# Patient Record
Sex: FEMALE | Race: WHITE | NOT HISPANIC OR LATINO | Employment: FULL TIME | URBAN - METROPOLITAN AREA
[De-identification: names, ages, dates, MRNs, and addresses within clinical notes are randomized per-mention and may not be internally consistent; named-entity substitution may affect disease eponyms.]

---

## 2020-02-28 ENCOUNTER — HOSPITAL ENCOUNTER (OUTPATIENT)
Dept: GENERAL RADIOLOGY | Facility: HOSPITAL | Age: 45
Discharge: HOME | End: 2020-02-28
Attending: FAMILY MEDICINE

## 2021-07-21 ENCOUNTER — TRANSCRIBE ORDERS (OUTPATIENT)
Dept: ADMINISTRATIVE | Facility: HOSPITAL | Age: 46
End: 2021-07-21

## 2021-07-21 DIAGNOSIS — Z12.31 ENCOUNTER FOR SCREENING MAMMOGRAM FOR BREAST CANCER: Primary | ICD-10-CM

## 2021-10-12 ENCOUNTER — APPOINTMENT (OUTPATIENT)
Dept: MAMMOGRAPHY | Facility: HOSPITAL | Age: 46
End: 2021-10-12

## 2022-01-04 ENCOUNTER — APPOINTMENT (OUTPATIENT)
Dept: MAMMOGRAPHY | Facility: HOSPITAL | Age: 47
End: 2022-01-04

## 2022-03-09 ENCOUNTER — HOSPITAL ENCOUNTER (OUTPATIENT)
Dept: MAMMOGRAPHY | Facility: HOSPITAL | Age: 47
Discharge: HOME | End: 2022-03-09
Admitting: NURSE PRACTITIONER

## 2022-03-09 DIAGNOSIS — Z12.31 ENCOUNTER FOR SCREENING MAMMOGRAM FOR BREAST CANCER: ICD-10-CM

## 2022-03-09 PROCEDURE — 77067 SCR MAMMO BI INCL CAD: CPT

## 2022-03-09 PROCEDURE — 77063 BREAST TOMOSYNTHESIS BI: CPT

## 2022-06-21 ENCOUNTER — CLINICAL SUPPORT (OUTPATIENT)
Dept: GASTROENTEROLOGY | Facility: CLINIC | Age: 47
End: 2022-06-21

## 2022-06-21 ENCOUNTER — TELEPHONE (OUTPATIENT)
Dept: GASTROENTEROLOGY | Facility: CLINIC | Age: 47
End: 2022-06-21

## 2022-06-21 ENCOUNTER — PREP FOR SURGERY (OUTPATIENT)
Dept: OTHER | Facility: HOSPITAL | Age: 47
End: 2022-06-21

## 2022-06-21 DIAGNOSIS — Z12.11 COLON CANCER SCREENING: Primary | ICD-10-CM

## 2022-06-21 RX ORDER — SUMATRIPTAN 100 MG/1
TABLET, FILM COATED ORAL
COMMUNITY
Start: 2022-05-02

## 2022-06-21 RX ORDER — METHOCARBAMOL 750 MG/1
TABLET, FILM COATED ORAL
COMMUNITY
Start: 2022-05-02 | End: 2022-09-27

## 2022-06-21 RX ORDER — ARIPIPRAZOLE 5 MG/1
TABLET ORAL
COMMUNITY
Start: 2022-06-01 | End: 2022-09-27

## 2022-06-21 RX ORDER — FLUOXETINE HYDROCHLORIDE 40 MG/1
40 CAPSULE ORAL DAILY
COMMUNITY
Start: 2022-06-01

## 2022-06-21 RX ORDER — BUPROPION HYDROCHLORIDE 150 MG/1
300 TABLET ORAL EVERY MORNING
COMMUNITY
Start: 2022-06-01

## 2022-06-21 NOTE — TELEPHONE ENCOUNTER
Rama Mccoy  REASON FOR CALL Colon Screening  SENT IN [facility]  No past medical history on file.  Allergies   Allergen Reactions   • Aspirin Unknown - High Severity     No past surgical history on file.  Social History     Socioeconomic History   • Marital status:    Tobacco Use   • Smoking status: Never Smoker   • Smokeless tobacco: Never Used   Vaping Use   • Vaping Use: Never used   Substance and Sexual Activity   • Alcohol use: Not Currently     Family History   Problem Relation Age of Onset   • Colon cancer Maternal Grandmother          around 40       Current Outpatient Medications:   •  ARIPiprazole (ABILIFY) 5 MG tablet, , Disp: , Rfl:   •  buPROPion XL (WELLBUTRIN XL) 150 MG 24 hr tablet, , Disp: , Rfl:   •  FLUoxetine (PROzac) 40 MG capsule, , Disp: , Rfl:   •  methocarbamol (ROBAXIN) 750 MG tablet, , Disp: , Rfl:   •  SUMAtriptan (IMITREX) 100 MG tablet, , Disp: , Rfl:

## 2022-06-22 PROBLEM — Z12.11 COLON CANCER SCREENING: Status: ACTIVE | Noted: 2022-06-22

## 2022-09-28 ENCOUNTER — ANESTHESIA EVENT (OUTPATIENT)
Dept: GASTROENTEROLOGY | Facility: HOSPITAL | Age: 47
End: 2022-09-28

## 2022-09-28 ENCOUNTER — HOSPITAL ENCOUNTER (OUTPATIENT)
Facility: HOSPITAL | Age: 47
Setting detail: HOSPITAL OUTPATIENT SURGERY
Discharge: HOME | End: 2022-09-28
Attending: INTERNAL MEDICINE | Admitting: INTERNAL MEDICINE

## 2022-09-28 ENCOUNTER — ANESTHESIA (OUTPATIENT)
Dept: GASTROENTEROLOGY | Facility: HOSPITAL | Age: 47
End: 2022-09-28

## 2022-09-28 VITALS
SYSTOLIC BLOOD PRESSURE: 117 MMHG | HEART RATE: 72 BPM | TEMPERATURE: 97.6 F | BODY MASS INDEX: 36.8 KG/M2 | OXYGEN SATURATION: 100 % | WEIGHT: 220.9 LBS | DIASTOLIC BLOOD PRESSURE: 81 MMHG | HEIGHT: 65 IN | RESPIRATION RATE: 11 BRPM

## 2022-09-28 DIAGNOSIS — Z12.11 COLON CANCER SCREENING: ICD-10-CM

## 2022-09-28 LAB — B-HCG UR QL: NEGATIVE

## 2022-09-28 PROCEDURE — 88305 TISSUE EXAM BY PATHOLOGIST: CPT | Performed by: INTERNAL MEDICINE

## 2022-09-28 PROCEDURE — 45385 COLONOSCOPY W/LESION REMOVAL: CPT | Performed by: INTERNAL MEDICINE

## 2022-09-28 PROCEDURE — 25010000002 PROPOFOL 10 MG/ML EMULSION: Performed by: NURSE ANESTHETIST, CERTIFIED REGISTERED

## 2022-09-28 PROCEDURE — 81025 URINE PREGNANCY TEST: CPT | Performed by: INTERNAL MEDICINE

## 2022-09-28 RX ORDER — SODIUM CHLORIDE, SODIUM LACTATE, POTASSIUM CHLORIDE, CALCIUM CHLORIDE 600; 310; 30; 20 MG/100ML; MG/100ML; MG/100ML; MG/100ML
30 INJECTION, SOLUTION INTRAVENOUS CONTINUOUS
Status: DISCONTINUED | OUTPATIENT
Start: 2022-09-28 | End: 2022-09-28 | Stop reason: HOSPADM

## 2022-09-28 RX ORDER — LIDOCAINE HYDROCHLORIDE 20 MG/ML
INJECTION, SOLUTION EPIDURAL; INFILTRATION; INTRACAUDAL; PERINEURAL AS NEEDED
Status: DISCONTINUED | OUTPATIENT
Start: 2022-09-28 | End: 2022-09-28 | Stop reason: SURG

## 2022-09-28 RX ORDER — PROPOFOL 10 MG/ML
VIAL (ML) INTRAVENOUS AS NEEDED
Status: DISCONTINUED | OUTPATIENT
Start: 2022-09-28 | End: 2022-09-28 | Stop reason: SURG

## 2022-09-28 RX ADMIN — PROPOFOL 200 MCG/KG/MIN: 10 INJECTION, EMULSION INTRAVENOUS at 12:58

## 2022-09-28 RX ADMIN — PROPOFOL 50 MG: 10 INJECTION, EMULSION INTRAVENOUS at 12:58

## 2022-09-28 RX ADMIN — LIDOCAINE HYDROCHLORIDE 100 MG: 20 INJECTION, SOLUTION EPIDURAL; INFILTRATION; INTRACAUDAL; PERINEURAL at 12:58

## 2022-09-28 RX ADMIN — SODIUM CHLORIDE, POTASSIUM CHLORIDE, SODIUM LACTATE AND CALCIUM CHLORIDE: 600; 310; 30; 20 INJECTION, SOLUTION INTRAVENOUS at 12:56

## 2022-09-28 NOTE — ANESTHESIA PREPROCEDURE EVALUATION
Anesthesia Evaluation     Patient summary reviewed and Nursing notes reviewed   no history of anesthetic complications:  NPO Solid Status: > 8 hours  NPO Liquid Status: > 2 hours           Airway   Mallampati: II  TM distance: >3 FB  Neck ROM: full  No difficulty expected and Large neck circumference  Dental      Pulmonary - normal exam    breath sounds clear to auscultation  (+) sleep apnea,   Cardiovascular - negative cardio ROS and normal exam  Exercise tolerance: good (4-7 METS)    Rhythm: regular  Rate: normal        Neuro/Psych  (+) psychiatric history Anxiety,    GI/Hepatic/Renal/Endo - negative ROS     Musculoskeletal (-) negative ROS    Abdominal    Substance History - negative use     OB/GYN negative ob/gyn ROS         Other - negative ROS       ROS/Med Hx Other: PAT Nursing Notes unavailable.                   Anesthesia Plan    ASA 2     general     (Total IV Anesthesia    Patient understands anesthesia not responsible for dental damage.  )  intravenous induction     Anesthetic plan, risks, benefits, and alternatives have been provided, discussed and informed consent has been obtained with: patient.    Plan discussed with CRNA.        CODE STATUS:

## 2022-09-28 NOTE — ANESTHESIA POSTPROCEDURE EVALUATION
Patient: Rama Mccoy    Procedure Summary     Date: 09/28/22 Room / Location: [facility] ENDOSCOPY 4 / [facility] ENDOSCOPY    Anesthesia Start: 1256 Anesthesia Stop: 1337    Procedure: COLONOSCOPY WITH POLYPECTOMY (N/A ) Diagnosis:       Colon cancer screening      (Colon cancer screening [Z12.11])    Surgeons: Neil Benavides MD Provider: Dean Mcelroy MD    Anesthesia Type: general ASA Status: 2          Anesthesia Type: general    Vitals  Vitals Value Taken Time   /81 09/28/22 1352   Temp 36.4 °C (97.6 °F) 09/28/22 1352   Pulse 72 09/28/22 1352   Resp 11 09/28/22 1352   SpO2 100 % 09/28/22 1352           Post Anesthesia Care and Evaluation    Patient location during evaluation: bedside  Patient participation: complete - patient participated  Level of consciousness: awake  Pain management: adequate    Airway patency: patent  Anesthetic complications: No anesthetic complications  PONV Status: none  Cardiovascular status: acceptable and stable  Respiratory status: acceptable  Hydration status: acceptable    Comments: An Anesthesiologist personally participated in the most demanding procedures (including induction and emergence if applicable) in the anesthesia plan, monitored the course of anesthesia administration at frequent intervals and remained physically present and available for immediate diagnosis and treatment of emergencies.

## 2022-09-29 LAB
CYTO UR: NORMAL
LAB AP CASE REPORT: NORMAL
LAB AP CLINICAL INFORMATION: NORMAL
PATH REPORT.FINAL DX SPEC: NORMAL
PATH REPORT.GROSS SPEC: NORMAL

## 2022-12-28 ENCOUNTER — TRANSCRIBE ORDERS (OUTPATIENT)
Dept: ADMINISTRATIVE | Facility: HOSPITAL | Age: 47
End: 2022-12-28
Payer: COMMERCIAL

## 2022-12-28 DIAGNOSIS — Z12.31 SCREENING MAMMOGRAM FOR BREAST CANCER: Primary | ICD-10-CM

## 2023-11-29 ENCOUNTER — HOSPITAL ENCOUNTER (OUTPATIENT)
Dept: MAMMOGRAPHY | Facility: HOSPITAL | Age: 48
Discharge: HOME | End: 2023-11-29
Admitting: NURSE PRACTITIONER
Payer: MEDICARE

## 2023-11-29 DIAGNOSIS — Z12.31 SCREENING MAMMOGRAM FOR BREAST CANCER: ICD-10-CM

## 2023-11-29 PROCEDURE — 77067 SCR MAMMO BI INCL CAD: CPT

## 2023-11-29 PROCEDURE — 77063 BREAST TOMOSYNTHESIS BI: CPT

## 2024-01-25 ENCOUNTER — OFFICE VISIT (OUTPATIENT)
Dept: FAMILY MEDICINE CLINIC | Facility: CLINIC | Age: 49
End: 2024-01-25
Payer: MEDICARE

## 2024-01-25 VITALS
SYSTOLIC BLOOD PRESSURE: 111 MMHG | WEIGHT: 218 LBS | HEART RATE: 78 BPM | BODY MASS INDEX: 36.32 KG/M2 | RESPIRATION RATE: 18 BRPM | OXYGEN SATURATION: 100 % | DIASTOLIC BLOOD PRESSURE: 68 MMHG | TEMPERATURE: 98.4 F | HEIGHT: 65 IN

## 2024-01-25 DIAGNOSIS — F41.1 GAD (GENERALIZED ANXIETY DISORDER): Chronic | ICD-10-CM

## 2024-01-25 DIAGNOSIS — F33.1 MODERATE EPISODE OF RECURRENT MAJOR DEPRESSIVE DISORDER: Chronic | ICD-10-CM

## 2024-01-25 DIAGNOSIS — E66.01 CLASS 2 SEVERE OBESITY DUE TO EXCESS CALORIES WITH SERIOUS COMORBIDITY AND BODY MASS INDEX (BMI) OF 36.0 TO 36.9 IN ADULT: ICD-10-CM

## 2024-01-25 DIAGNOSIS — Z00.00 MEDICARE ANNUAL WELLNESS VISIT, SUBSEQUENT: Primary | ICD-10-CM

## 2024-01-25 DIAGNOSIS — E78.2 MIXED HYPERLIPIDEMIA: Chronic | ICD-10-CM

## 2024-01-25 RX ORDER — GALCANEZUMAB 120 MG/ML
INJECTION, SOLUTION SUBCUTANEOUS
COMMUNITY
Start: 2024-01-17

## 2024-01-25 RX ORDER — FLUOXETINE HYDROCHLORIDE 40 MG/1
40 CAPSULE ORAL DAILY
Status: CANCELLED | OUTPATIENT
Start: 2024-01-25

## 2024-01-25 RX ORDER — RISPERIDONE 0.5 MG/1
TABLET ORAL
COMMUNITY
Start: 2024-01-17 | End: 2024-02-02

## 2024-01-25 RX ORDER — PANTOPRAZOLE SODIUM 40 MG/1
TABLET, DELAYED RELEASE ORAL
COMMUNITY

## 2024-01-25 RX ORDER — ATORVASTATIN CALCIUM 40 MG/1
40 TABLET, FILM COATED ORAL DAILY
COMMUNITY

## 2024-01-25 RX ORDER — PRAZOSIN HYDROCHLORIDE 1 MG/1
CAPSULE ORAL
COMMUNITY
Start: 2024-01-17

## 2024-01-25 RX ORDER — HYDROXYZINE HYDROCHLORIDE 25 MG/1
TABLET, FILM COATED ORAL
COMMUNITY
Start: 2023-12-22

## 2024-01-25 RX ORDER — CYANOCOBALAMIN 1000 UG/ML
1000 INJECTION, SOLUTION INTRAMUSCULAR; SUBCUTANEOUS
COMMUNITY
Start: 2023-11-16 | End: 2024-02-14

## 2024-01-25 RX ORDER — TOPIRAMATE 100 MG/1
100 TABLET, FILM COATED ORAL DAILY
COMMUNITY
Start: 2023-11-16

## 2024-01-25 RX ORDER — DIAZEPAM 10 MG/1
TABLET ORAL
COMMUNITY
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
## 2024-01-26 ENCOUNTER — PATIENT ROUNDING (BHMG ONLY) (OUTPATIENT)
Dept: FAMILY MEDICINE CLINIC | Facility: CLINIC | Age: 49
End: 2024-01-26
Payer: MEDICARE

## 2024-01-31 ENCOUNTER — TELEPHONE (OUTPATIENT)
Dept: FAMILY MEDICINE CLINIC | Facility: CLINIC | Age: 49
End: 2024-01-31
Payer: MEDICARE

## 2024-01-31 NOTE — TELEPHONE ENCOUNTER
Caller: Rama Mccoy    Relationship to patient: Self    Best call back number: [phone]    Chief complaint: CONGESTION, BODY ACHES, RUNNY NOSE    Type of visit: SAME DAY    Requested date: 1/31/2024    If rescheduling, when is the original appointment: N/A    Additional notes:PATIENT IS REQUESTING AN APPOINTMENT TODAY IF ANYTHING BECOMES AVAILABLE THIS AFTERNOON

## 2024-02-02 ENCOUNTER — TELEPHONE (OUTPATIENT)
Dept: FAMILY MEDICINE CLINIC | Facility: CLINIC | Age: 49
End: 2024-02-02

## 2024-02-02 ENCOUNTER — OFFICE VISIT (OUTPATIENT)
Dept: FAMILY MEDICINE CLINIC | Facility: CLINIC | Age: 49
End: 2024-02-02
Payer: MEDICARE

## 2024-02-02 VITALS
TEMPERATURE: 98.4 F | WEIGHT: 220 LBS | SYSTOLIC BLOOD PRESSURE: 109 MMHG | HEART RATE: 80 BPM | BODY MASS INDEX: 36.65 KG/M2 | DIASTOLIC BLOOD PRESSURE: 71 MMHG | RESPIRATION RATE: 22 BRPM | OXYGEN SATURATION: 98 % | HEIGHT: 65 IN

## 2024-02-02 DIAGNOSIS — R05.9 COUGH, UNSPECIFIED TYPE: ICD-10-CM

## 2024-02-02 DIAGNOSIS — J06.9 UPPER RESPIRATORY TRACT INFECTION, UNSPECIFIED TYPE: Primary | ICD-10-CM

## 2024-02-02 DIAGNOSIS — F33.1 MODERATE EPISODE OF RECURRENT MAJOR DEPRESSIVE DISORDER: ICD-10-CM

## 2024-02-02 DIAGNOSIS — E66.01 CLASS 2 SEVERE OBESITY DUE TO EXCESS CALORIES WITH SERIOUS COMORBIDITY AND BODY MASS INDEX (BMI) OF 36.0 TO 36.9 IN ADULT: Chronic | ICD-10-CM

## 2024-02-02 PROBLEM — F41.1 GAD (GENERALIZED ANXIETY DISORDER): Status: ACTIVE | Noted: 2024-02-02

## 2024-02-02 PROBLEM — Z00.00 MEDICARE ANNUAL WELLNESS VISIT, SUBSEQUENT: Status: ACTIVE | Noted: 2022-06-22

## 2024-02-02 PROBLEM — E78.2 MIXED HYPERLIPIDEMIA: Status: ACTIVE | Noted: 2024-02-02

## 2024-02-02 PROBLEM — F41.1 GAD (GENERALIZED ANXIETY DISORDER): Chronic | Status: ACTIVE | Noted: 2024-02-02

## 2024-02-02 PROBLEM — E66.812 CLASS 2 SEVERE OBESITY DUE TO EXCESS CALORIES WITH SERIOUS COMORBIDITY AND BODY MASS INDEX (BMI) OF 36.0 TO 36.9 IN ADULT: Chronic | Status: ACTIVE | Noted: 2024-02-02

## 2024-02-02 PROBLEM — G43.909 MIGRAINE WITHOUT STATUS MIGRAINOSUS, NOT INTRACTABLE: Status: ACTIVE | Noted: 2024-02-02

## 2024-02-02 PROBLEM — E78.2 MIXED HYPERLIPIDEMIA: Chronic | Status: ACTIVE | Noted: 2024-02-02

## 2024-02-02 PROBLEM — Z12.11 COLON CANCER SCREENING: Status: RESOLVED | Noted: 2022-06-22 | Resolved: 2024-02-02

## 2024-02-02 PROBLEM — G43.909 MIGRAINE WITHOUT STATUS MIGRAINOSUS, NOT INTRACTABLE: Chronic | Status: ACTIVE | Noted: 2024-02-02

## 2024-02-02 PROBLEM — E66.812 CLASS 2 SEVERE OBESITY DUE TO EXCESS CALORIES WITH SERIOUS COMORBIDITY AND BODY MASS INDEX (BMI) OF 36.0 TO 36.9 IN ADULT: Status: ACTIVE | Noted: 2024-02-02

## 2024-02-02 LAB
EXPIRATION DATE: NORMAL
EXPIRATION DATE: NORMAL
FLUAV AG UPPER RESP QL IA.RAPID: NOT DETECTED
FLUBV AG UPPER RESP QL IA.RAPID: NOT DETECTED
INTERNAL CONTROL: NORMAL
INTERNAL CONTROL: NORMAL
Lab: 8172
Lab: 9127
S PYO AG THROAT QL: NEGATIVE
SARS-COV-2 AG UPPER RESP QL IA.RAPID: NOT DETECTED

## 2024-02-02 PROCEDURE — 87880 STREP A ASSAY W/OPTIC: CPT | Performed by: FAMILY MEDICINE

## 2024-02-02 PROCEDURE — 99214 OFFICE O/P EST MOD 30 MIN: CPT | Performed by: FAMILY MEDICINE

## 2024-02-02 RX ORDER — LURASIDONE HYDROCHLORIDE 20 MG/1
TABLET, FILM COATED ORAL
Qty: 45 TABLET | Refills: 0 | Status: SHIPPED | OUTPATIENT
Start: 2024-02-02 | End: 2024-03-03

## 2024-02-02 RX ORDER — PREDNISONE 20 MG/1
TABLET ORAL
Qty: 12 TABLET | Refills: 0 | Status: SHIPPED | OUTPATIENT
Start: 2024-02-02

## 2024-02-02 NOTE — ASSESSMENT & PLAN NOTE
Patient's depression is recurrent and is severe without psychosis. Their depression is currently active and the condition is worsening. This will be reassessed in 3 months. F/U as described:patient was prescribed an antidepressant medicine.    The patient will be continued on Prozac 40 twice a day as well as Pristiq 150 mg daily.  She was taken off the Risperdal and started on Latuda 20 mg to be titrated up to 40 mg over the next couple weeks.  Will see her back in the clinic in about 10 days and manage according presentation.
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
## 2024-02-02 NOTE — ASSESSMENT & PLAN NOTE
Patient's (Body mass index is 36.61 kg/m².) indicates that they are morbidly/severely obese (BMI > 40 or > 35 with obesity - related health condition) with health conditions that include dyslipidemias . Weight is worsening. BMI  is above average; BMI management plan is completed. We discussed low calorie, low carb based diet program, portion control, and increasing exercise.

## 2024-02-02 NOTE — TELEPHONE ENCOUNTER
Caller: Rama Mccoy    Relationship: Self    Best call back number: [phone]    What medication are you requesting: LATUDA       Have you had these symptoms before:    [x] Yes  [] No    Have you been treated for these symptoms before:   [x] Yes  [] No    If a prescription is needed, what is your preferred pharmacy and phone number: [facility] [id number] - [address] AT [facility] [address] &  - [phone] [facility] [phone] FX     Additional notes:  THIS MEDICATION WAS DISCUSSED AT THE APPOINTMENT THIS MORNING TO REPLACE RISPERDAL, BUT A PRESCRIPTION WAS NOT SENT

## 2024-02-02 NOTE — ASSESSMENT & PLAN NOTE
Patient's (Body mass index is 36.28 kg/m².) indicates that they are morbidly/severely obese (BMI > 40 or > 35 with obesity - related health condition) with health conditions that include dyslipidemias . Weight is worsening. BMI  is above average; BMI management plan is completed. We discussed low calorie, low carb based diet program, portion control, and increasing exercise.

## 2024-02-02 NOTE — ASSESSMENT & PLAN NOTE
Patient's depression is recurrent and is severe without psychosis. Their depression is currently active and the condition is worsening. This will be reassessed in 3 months. F/U as described:patient was prescribed an antidepressant medicine.    She is going to start the latuda now and she has stopped respiradone. Follow-up in 10 days.

## 2024-02-07 ENCOUNTER — TELEPHONE (OUTPATIENT)
Dept: FAMILY MEDICINE CLINIC | Facility: CLINIC | Age: 49
End: 2024-02-07
Payer: MEDICARE

## 2024-02-07 DIAGNOSIS — R06.2 WHEEZING: Primary | ICD-10-CM

## 2024-02-07 RX ORDER — ALBUTEROL SULFATE 2.5 MG/.5ML
2.5 SOLUTION RESPIRATORY (INHALATION) EVERY 4 HOURS PRN
Qty: 150 ML | Refills: 1 | Status: SHIPPED | OUTPATIENT
Start: 2024-02-07

## 2024-02-08 DIAGNOSIS — R06.2 WHEEZING: Primary | ICD-10-CM

## 2024-02-09 ENCOUNTER — TELEPHONE (OUTPATIENT)
Dept: FAMILY MEDICINE CLINIC | Facility: CLINIC | Age: 49
End: 2024-02-09
Payer: MEDICARE

## 2024-02-09 ENCOUNTER — TELEPHONE (OUTPATIENT)
Dept: FAMILY MEDICINE CLINIC | Facility: CLINIC | Age: 49
End: 2024-02-09

## 2024-02-09 NOTE — TELEPHONE ENCOUNTER
Caller: Rama Mccoy    Relationship: Self    Best call back number: [phone]    What medication are you requesting: NEBULIZER    What are your current symptoms: N/A    How long have you been experiencing symptoms: N/A    Have you had these symptoms before:    [] Yes  [] No    Have you been treated for these symptoms before:   [] Yes  [] No    If a prescription is needed, what is your preferred pharmacy and phone number: [facility] [id number] - [address] AT [facility] [address] &  - [phone] [facility] [phone] FX     Additional notes:PATIENT WAS ABLE TO GET THE NEBULIZER SOLUTION.HOWEVER, SHE NEEDS A PRESCRIPTION SENT TO [facility] FOR A NEW NEBULIZER. PLEASE SEND NEW PRESCRIPTION TO PHARMACY ASAP.
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
## 2024-02-22 ENCOUNTER — OFFICE VISIT (OUTPATIENT)
Dept: FAMILY MEDICINE CLINIC | Facility: CLINIC | Age: 49
End: 2024-02-22
Payer: MEDICARE

## 2024-02-22 VITALS
HEART RATE: 82 BPM | WEIGHT: 222.8 LBS | BODY MASS INDEX: 38.04 KG/M2 | TEMPERATURE: 98.1 F | DIASTOLIC BLOOD PRESSURE: 79 MMHG | OXYGEN SATURATION: 100 % | RESPIRATION RATE: 18 BRPM | HEIGHT: 64 IN | SYSTOLIC BLOOD PRESSURE: 137 MMHG

## 2024-02-22 DIAGNOSIS — F41.1 GAD (GENERALIZED ANXIETY DISORDER): Chronic | ICD-10-CM

## 2024-02-22 DIAGNOSIS — Z79.899 MEDICATION MANAGEMENT: ICD-10-CM

## 2024-02-22 DIAGNOSIS — E66.01 CLASS 2 SEVERE OBESITY DUE TO EXCESS CALORIES WITH SERIOUS COMORBIDITY AND BODY MASS INDEX (BMI) OF 38.0 TO 38.9 IN ADULT: ICD-10-CM

## 2024-02-22 DIAGNOSIS — Z71.3 ENCOUNTER FOR WEIGHT LOSS COUNSELING: ICD-10-CM

## 2024-02-22 DIAGNOSIS — F33.1 MODERATE EPISODE OF RECURRENT MAJOR DEPRESSIVE DISORDER: Primary | ICD-10-CM

## 2024-02-22 PROBLEM — Z00.00 MEDICARE ANNUAL WELLNESS VISIT, SUBSEQUENT: Status: RESOLVED | Noted: 2022-06-22 | Resolved: 2024-02-22

## 2024-02-22 LAB
AMPHET+METHAMPHET UR QL: NEGATIVE
AMPHETAMINE INTERNAL CONTROL: ABNORMAL
AMPHETAMINES UR QL: NEGATIVE
BARBITURATE INTERNAL CONTROL: ABNORMAL
BARBITURATES UR QL SCN: NEGATIVE
BENZODIAZ UR QL SCN: POSITIVE
BENZODIAZEPINE INTERNAL CONTROL: ABNORMAL
BUPRENORPHINE INTERNAL CONTROL: ABNORMAL
BUPRENORPHINE SERPL-MCNC: NEGATIVE NG/ML
CANNABINOIDS SERPL QL: POSITIVE
COCAINE INTERNAL CONTROL: ABNORMAL
COCAINE UR QL: NEGATIVE
EXPIRATION DATE: ABNORMAL
Lab: ABNORMAL
MDMA (ECSTASY) INTERNAL CONTROL: ABNORMAL
MDMA UR QL SCN: NEGATIVE
METHADONE INTERNAL CONTROL: ABNORMAL
METHADONE UR QL SCN: NEGATIVE
METHAMPHETAMINE INTERNAL CONTROL: ABNORMAL
MORPHINE INTERNAL CONTROL: ABNORMAL
MORPHINE/OPIATES SCREEN, URINE: NEGATIVE
OXYCODONE INTERNAL CONTROL: ABNORMAL
OXYCODONE UR QL SCN: NEGATIVE
PCP UR QL SCN: NEGATIVE
PHENCYCLIDINE INTERNAL CONTROL: ABNORMAL
THC INTERNAL CONTROL: ABNORMAL

## 2024-02-22 RX ORDER — PHENTERMINE HYDROCHLORIDE 15 MG/1
15 CAPSULE ORAL EVERY MORNING
Qty: 30 CAPSULE | Refills: 0 | Status: SHIPPED | OUTPATIENT
Start: 2024-02-22

## 2024-02-22 RX ORDER — RISPERIDONE 1 MG/1
TABLET ORAL
COMMUNITY
Start: 2024-02-21 | End: 2024-02-22

## 2024-02-22 RX ORDER — LURASIDONE HYDROCHLORIDE 40 MG/1
40 TABLET, FILM COATED ORAL DAILY
Qty: 30 TABLET | Refills: 0 | Status: SHIPPED | OUTPATIENT
Start: 2024-02-22 | End: 2024-03-23

## 2024-02-22 NOTE — ASSESSMENT & PLAN NOTE
Patient's (Body mass index is 38.24 kg/m².) indicates that they are morbidly/severely obese (BMI > 40 or > 35 with obesity - related health condition) with health conditions that include dyslipidemias . Weight is newly identified. BMI  is above average; BMI management plan is completed. We discussed low calorie, low carb based diet program, portion control, and increasing exercise.

## 2024-02-22 NOTE — ASSESSMENT & PLAN NOTE
Patient's depression is a recurrent episode that is moderate without psychosis. Depression is active and improving with treatment.    Plan:   Continue current medication therapy     Followup in 6 months.
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
## 2024-02-23 NOTE — ASSESSMENT & PLAN NOTE
Psychological condition is unchanged.  Medication changes per orders.  Psychological condition  will be reassessed in 4 weeks.

## 2024-03-07 DIAGNOSIS — Z71.3 ENCOUNTER FOR WEIGHT LOSS COUNSELING: ICD-10-CM

## 2024-03-07 RX ORDER — PHENTERMINE HYDROCHLORIDE 30 MG/1
30 CAPSULE ORAL EVERY MORNING
Qty: 30 CAPSULE | Refills: 1 | Status: SHIPPED | OUTPATIENT
Start: 2024-03-07

## 2024-03-19 DIAGNOSIS — F33.1 MODERATE EPISODE OF RECURRENT MAJOR DEPRESSIVE DISORDER: ICD-10-CM

## 2024-03-19 RX ORDER — LURASIDONE HYDROCHLORIDE 40 MG/1
40 TABLET, FILM COATED ORAL DAILY
Qty: 30 TABLET | Refills: 0 | Status: SHIPPED | OUTPATIENT
Start: 2024-03-19

## 2024-03-28 ENCOUNTER — OFFICE VISIT (OUTPATIENT)
Dept: FAMILY MEDICINE CLINIC | Facility: CLINIC | Age: 49
End: 2024-03-28
Payer: MEDICARE

## 2024-03-28 VITALS
DIASTOLIC BLOOD PRESSURE: 79 MMHG | HEIGHT: 64 IN | OXYGEN SATURATION: 97 % | HEART RATE: 91 BPM | BODY MASS INDEX: 38.38 KG/M2 | TEMPERATURE: 97.8 F | SYSTOLIC BLOOD PRESSURE: 126 MMHG | WEIGHT: 224.8 LBS

## 2024-03-28 DIAGNOSIS — E66.01 CLASS 2 SEVERE OBESITY DUE TO EXCESS CALORIES WITH SERIOUS COMORBIDITY AND BODY MASS INDEX (BMI) OF 38.0 TO 38.9 IN ADULT: Chronic | ICD-10-CM

## 2024-03-28 DIAGNOSIS — Z71.3 ENCOUNTER FOR WEIGHT LOSS COUNSELING: Primary | ICD-10-CM

## 2024-03-28 DIAGNOSIS — K21.9 GASTROESOPHAGEAL REFLUX DISEASE WITHOUT ESOPHAGITIS: ICD-10-CM

## 2024-03-28 DIAGNOSIS — E78.2 MIXED HYPERLIPIDEMIA: Chronic | ICD-10-CM

## 2024-03-28 RX ORDER — PANTOPRAZOLE SODIUM 40 MG/1
40 TABLET, DELAYED RELEASE ORAL DAILY
Qty: 90 TABLET | Refills: 1 | Status: SHIPPED | OUTPATIENT
Start: 2024-03-28

## 2024-03-28 RX ORDER — PHENTERMINE HYDROCHLORIDE 37.5 MG/1
37.5 CAPSULE ORAL EVERY MORNING
Qty: 30 CAPSULE | Refills: 1 | Status: SHIPPED | OUTPATIENT
Start: 2024-03-28

## 2024-03-28 RX ORDER — DESVENLAFAXINE SUCCINATE 50 MG/1
TABLET, EXTENDED RELEASE ORAL
COMMUNITY
Start: 2024-03-22

## 2024-03-28 NOTE — ASSESSMENT & PLAN NOTE
Patient's (Body mass index is 38.59 kg/m².) indicates that they are morbidly/severely obese (BMI > 40 or > 35 with obesity - related health condition) with health conditions that include dyslipidemias . Weight is worsening. BMI  is above average; BMI management plan is completed. We discussed low calorie, low carb based diet program, portion control, increasing exercise, and pharmacologic options including Phentermine .

## 2024-03-28 NOTE — ASSESSMENT & PLAN NOTE
Lipid abnormalities are improving with treatment    Plan:  Continue same medication/s without change.      Discussed medication dosage, use, side effects, and goals of treatment in detail.    Counseled patient on lifestyle modifications to help control hyperlipidemia.   Cholesterol lowering dietary information shared with patient.    Patient Treatment Goals:   LDL goal is under 100    Followup in 6 months.
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
## 2024-05-03 ENCOUNTER — EXTERNAL PBMM DATA (OUTPATIENT)
Dept: PHARMACY | Facility: OTHER | Age: 49
End: 2024-05-03
Payer: MEDICARE

## 2024-05-21 ENCOUNTER — POP HEALTH PHARMACY (OUTPATIENT)
Dept: PHARMACY | Facility: OTHER | Age: 49
End: 2024-05-21
Payer: MEDICARE

## 2024-05-21 DIAGNOSIS — R06.2 WHEEZING: ICD-10-CM

## 2024-05-21 RX ORDER — ALBUTEROL SULFATE 90 UG/1
2 AEROSOL, METERED RESPIRATORY (INHALATION) EVERY 4 HOURS PRN
Qty: 18 G | Refills: 11 | Status: SHIPPED | OUTPATIENT
Start: 2024-05-21

## 2024-05-29 ENCOUNTER — HOSPITAL ENCOUNTER (EMERGENCY)
Facility: HOSPITAL | Age: 49
Discharge: PSYCHIATRIC OR SUBSTANCE USE FACILITY | DRG: 885 | End: 2024-05-29
Attending: EMERGENCY MEDICINE | Admitting: EMERGENCY MEDICINE
Payer: MEDICARE

## 2024-05-29 ENCOUNTER — HOSPITAL ENCOUNTER (INPATIENT)
Facility: HOSPITAL | Age: 49
LOS: 2 days | Discharge: HOME | DRG: 885 | End: 2024-05-31
Attending: PSYCHIATRY & NEUROLOGY | Admitting: PSYCHIATRY & NEUROLOGY
Payer: MEDICARE

## 2024-05-29 VITALS
DIASTOLIC BLOOD PRESSURE: 75 MMHG | TEMPERATURE: 97.6 F | WEIGHT: 215 LBS | SYSTOLIC BLOOD PRESSURE: 114 MMHG | BODY MASS INDEX: 35.82 KG/M2 | HEIGHT: 65 IN | OXYGEN SATURATION: 97 % | RESPIRATION RATE: 24 BRPM | HEART RATE: 95 BPM

## 2024-05-29 DIAGNOSIS — R45.851 SUICIDAL IDEATION: Primary | ICD-10-CM

## 2024-05-29 DIAGNOSIS — F32.A DEPRESSION, UNSPECIFIED DEPRESSION TYPE: ICD-10-CM

## 2024-05-29 LAB
ALBUMIN SERPL-MCNC: 3.9 G/DL (ref 3.5–5.2)
ALBUMIN/GLOB SERPL: 1.1 G/DL
ALP SERPL-CCNC: 92 U/L (ref 39–117)
ALT SERPL W P-5'-P-CCNC: 21 U/L (ref 1–33)
AMPHET+METHAMPHET UR QL: NEGATIVE
ANION GAP SERPL CALCULATED.3IONS-SCNC: 13.5 MMOL/L (ref 5–15)
APAP SERPL-MCNC: <5 MCG/ML (ref 0–30)
AST SERPL-CCNC: 15 U/L (ref 1–32)
B-HCG UR QL: NEGATIVE
BARBITURATES UR QL SCN: NEGATIVE
BASOPHILS # BLD AUTO: 0.06 10*3/MM3 (ref 0–0.2)
BASOPHILS NFR BLD AUTO: 0.6 % (ref 0–1.5)
BENZODIAZ UR QL SCN: POSITIVE
BILIRUB SERPL-MCNC: 0.2 MG/DL (ref 0–1.2)
BUN SERPL-MCNC: 12 MG/DL (ref 6–20)
BUN/CREAT SERPL: 13.3 (ref 7–25)
CALCIUM SPEC-SCNC: 9 MG/DL (ref 8.6–10.5)
CANNABINOIDS SERPL QL: POSITIVE
CHLORIDE SERPL-SCNC: 102 MMOL/L (ref 98–107)
CO2 SERPL-SCNC: 21.5 MMOL/L (ref 22–29)
COCAINE UR QL: NEGATIVE
CREAT SERPL-MCNC: 0.9 MG/DL (ref 0.57–1)
DEPRECATED RDW RBC AUTO: 44.8 FL (ref 37–54)
EGFRCR SERPLBLD CKD-EPI 2021: 78.5 ML/MIN/1.73
EOSINOPHIL # BLD AUTO: 0.39 10*3/MM3 (ref 0–0.4)
EOSINOPHIL NFR BLD AUTO: 3.9 % (ref 0.3–6.2)
ERYTHROCYTE [DISTWIDTH] IN BLOOD BY AUTOMATED COUNT: 14.7 % (ref 12.3–15.4)
ETHANOL BLD-MCNC: <10 MG/DL (ref 0–10)
ETHANOL UR QL: <0.01 %
FENTANYL UR-MCNC: NEGATIVE NG/ML
GLOBULIN UR ELPH-MCNC: 3.5 GM/DL
GLUCOSE SERPL-MCNC: 82 MG/DL (ref 65–99)
HCG SERPL QL: NEGATIVE
HCT VFR BLD AUTO: 40.2 % (ref 34–46.6)
HGB BLD-MCNC: 13.3 G/DL (ref 12–15.9)
HOLD SPECIMEN: NORMAL
IMM GRANULOCYTES # BLD AUTO: 0.03 10*3/MM3 (ref 0–0.05)
IMM GRANULOCYTES NFR BLD AUTO: 0.3 % (ref 0–0.5)
LYMPHOCYTES # BLD AUTO: 2.55 10*3/MM3 (ref 0.7–3.1)
LYMPHOCYTES NFR BLD AUTO: 25.8 % (ref 19.6–45.3)
MCH RBC QN AUTO: 27.8 PG (ref 26.6–33)
MCHC RBC AUTO-ENTMCNC: 33.1 G/DL (ref 31.5–35.7)
MCV RBC AUTO: 84.1 FL (ref 79–97)
METHADONE UR QL SCN: NEGATIVE
MONOCYTES # BLD AUTO: 0.74 10*3/MM3 (ref 0.1–0.9)
MONOCYTES NFR BLD AUTO: 7.5 % (ref 5–12)
NEUTROPHILS NFR BLD AUTO: 6.13 10*3/MM3 (ref 1.7–7)
NEUTROPHILS NFR BLD AUTO: 61.9 % (ref 42.7–76)
NRBC BLD AUTO-RTO: 0 /100 WBC (ref 0–0.2)
OPIATES UR QL: NEGATIVE
OXYCODONE UR QL SCN: NEGATIVE
PLATELET # BLD AUTO: 282 10*3/MM3 (ref 140–450)
PMV BLD AUTO: 9.3 FL (ref 6–12)
POTASSIUM SERPL-SCNC: 4.2 MMOL/L (ref 3.5–5.2)
PROT SERPL-MCNC: 7.4 G/DL (ref 6–8.5)
RBC # BLD AUTO: 4.78 10*6/MM3 (ref 3.77–5.28)
SALICYLATES SERPL-MCNC: <0.3 MG/DL
SODIUM SERPL-SCNC: 137 MMOL/L (ref 136–145)
T4 FREE SERPL-MCNC: 1 NG/DL (ref 0.92–1.68)
TSH SERPL DL<=0.05 MIU/L-ACNC: 3.89 UIU/ML (ref 0.27–4.2)
WBC NRBC COR # BLD AUTO: 9.9 10*3/MM3 (ref 3.4–10.8)
WHOLE BLOOD HOLD COAG: NORMAL
WHOLE BLOOD HOLD SPECIMEN: NORMAL

## 2024-05-29 PROCEDURE — 80307 DRUG TEST PRSMV CHEM ANLYZR: CPT | Performed by: EMERGENCY MEDICINE

## 2024-05-29 PROCEDURE — 84703 CHORIONIC GONADOTROPIN ASSAY: CPT | Performed by: EMERGENCY MEDICINE

## 2024-05-29 PROCEDURE — 84439 ASSAY OF FREE THYROXINE: CPT | Performed by: EMERGENCY MEDICINE

## 2024-05-29 PROCEDURE — 80179 DRUG ASSAY SALICYLATE: CPT | Performed by: EMERGENCY MEDICINE

## 2024-05-29 PROCEDURE — 99285 EMERGENCY DEPT VISIT HI MDM: CPT

## 2024-05-29 PROCEDURE — 80143 DRUG ASSAY ACETAMINOPHEN: CPT | Performed by: EMERGENCY MEDICINE

## 2024-05-29 PROCEDURE — 36415 COLL VENOUS BLD VENIPUNCTURE: CPT

## 2024-05-29 PROCEDURE — 81025 URINE PREGNANCY TEST: CPT | Performed by: PSYCHIATRY & NEUROLOGY

## 2024-05-29 PROCEDURE — 82077 ASSAY SPEC XCP UR&BREATH IA: CPT | Performed by: EMERGENCY MEDICINE

## 2024-05-29 PROCEDURE — 80053 COMPREHEN METABOLIC PANEL: CPT | Performed by: EMERGENCY MEDICINE

## 2024-05-29 PROCEDURE — 84443 ASSAY THYROID STIM HORMONE: CPT | Performed by: EMERGENCY MEDICINE

## 2024-05-29 PROCEDURE — 85025 COMPLETE CBC W/AUTO DIFF WBC: CPT | Performed by: EMERGENCY MEDICINE

## 2024-05-29 RX ORDER — HALOPERIDOL 5 MG/1
5 TABLET ORAL EVERY 4 HOURS PRN
Status: DISCONTINUED | OUTPATIENT
Start: 2024-05-29 | End: 2024-05-31 | Stop reason: HOSPADM

## 2024-05-29 RX ORDER — ALUMINA, MAGNESIA, AND SIMETHICONE 2400; 2400; 240 MG/30ML; MG/30ML; MG/30ML
15 SUSPENSION ORAL EVERY 6 HOURS PRN
Status: DISCONTINUED | OUTPATIENT
Start: 2024-05-29 | End: 2024-05-31 | Stop reason: HOSPADM

## 2024-05-29 RX ORDER — DIPHENHYDRAMINE HCL 50 MG
50 CAPSULE ORAL EVERY 4 HOURS PRN
Status: DISCONTINUED | OUTPATIENT
Start: 2024-05-29 | End: 2024-05-31 | Stop reason: HOSPADM

## 2024-05-29 RX ORDER — TRAZODONE HYDROCHLORIDE 100 MG/1
100 TABLET ORAL NIGHTLY PRN
Status: DISCONTINUED | OUTPATIENT
Start: 2024-05-29 | End: 2024-05-31 | Stop reason: HOSPADM

## 2024-05-29 RX ORDER — HALOPERIDOL 5 MG/ML
5 INJECTION INTRAMUSCULAR EVERY 4 HOURS PRN
Status: DISCONTINUED | OUTPATIENT
Start: 2024-05-29 | End: 2024-05-31 | Stop reason: HOSPADM

## 2024-05-29 RX ORDER — LOPERAMIDE HYDROCHLORIDE 2 MG/1
2 CAPSULE ORAL
Status: DISCONTINUED | OUTPATIENT
Start: 2024-05-29 | End: 2024-05-31 | Stop reason: HOSPADM

## 2024-05-29 RX ORDER — NICOTINE 21 MG/24HR
1 PATCH, TRANSDERMAL 24 HOURS TRANSDERMAL DAILY PRN
Status: DISCONTINUED | OUTPATIENT
Start: 2024-05-29 | End: 2024-05-31 | Stop reason: HOSPADM

## 2024-05-29 RX ORDER — HYDROXYZINE PAMOATE 50 MG/1
50 CAPSULE ORAL EVERY 6 HOURS PRN
Status: DISCONTINUED | OUTPATIENT
Start: 2024-05-29 | End: 2024-05-31 | Stop reason: HOSPADM

## 2024-05-29 RX ORDER — DIPHENHYDRAMINE HYDROCHLORIDE 50 MG/ML
50 INJECTION INTRAMUSCULAR; INTRAVENOUS EVERY 4 HOURS PRN
Status: DISCONTINUED | OUTPATIENT
Start: 2024-05-29 | End: 2024-05-31 | Stop reason: HOSPADM

## 2024-05-29 RX ORDER — ACETAMINOPHEN 325 MG/1
650 TABLET ORAL EVERY 4 HOURS PRN
Status: DISCONTINUED | OUTPATIENT
Start: 2024-05-29 | End: 2024-05-31 | Stop reason: HOSPADM

## 2024-05-29 RX ORDER — LURASIDONE HYDROCHLORIDE 80 MG/1
80 TABLET, FILM COATED ORAL DAILY
COMMUNITY
Start: 2024-05-26

## 2024-05-29 RX ORDER — DIAZEPAM 5 MG/ML
2.5 INJECTION, SOLUTION INTRAMUSCULAR; INTRAVENOUS EVERY 4 HOURS PRN
Status: DISCONTINUED | OUTPATIENT
Start: 2024-05-29 | End: 2024-05-31 | Stop reason: HOSPADM

## 2024-05-29 RX ORDER — LORAZEPAM 2 MG/1
2 TABLET ORAL EVERY 4 HOURS PRN
Status: DISCONTINUED | OUTPATIENT
Start: 2024-05-29 | End: 2024-05-31 | Stop reason: HOSPADM

## 2024-05-29 RX ORDER — DIAZEPAM 5 MG/1
10 TABLET ORAL ONCE
Status: COMPLETED | OUTPATIENT
Start: 2024-05-29 | End: 2024-05-29

## 2024-05-29 RX ADMIN — DIAZEPAM 10 MG: 5 TABLET ORAL at 21:41

## 2024-05-30 ENCOUNTER — EXTERNAL PBMM DATA (OUTPATIENT)
Dept: PHARMACY | Facility: OTHER | Age: 49
End: 2024-05-30
Payer: MEDICARE

## 2024-05-30 PROBLEM — E66.812 CLASS 2 OBESITY WITH BODY MASS INDEX (BMI) OF 35.0 TO 35.9 IN ADULT: Status: ACTIVE | Noted: 2024-05-30

## 2024-05-30 PROBLEM — E66.9 CLASS 2 OBESITY WITH BODY MASS INDEX (BMI) OF 35.0 TO 35.9 IN ADULT: Status: ACTIVE | Noted: 2024-05-30

## 2024-05-30 PROBLEM — F33.2 SEVERE RECURRENT MAJOR DEPRESSION WITHOUT PSYCHOTIC FEATURES: Status: ACTIVE | Noted: 2024-05-30

## 2024-05-30 PROBLEM — J45.909 ASTHMA: Status: ACTIVE | Noted: 2024-05-30

## 2024-05-30 PROBLEM — F12.10 MARIJUANA ABUSE: Status: ACTIVE | Noted: 2024-05-30

## 2024-05-30 RX ORDER — LURASIDONE HYDROCHLORIDE 80 MG/1
80 TABLET, FILM COATED ORAL DAILY
Status: DISCONTINUED | OUTPATIENT
Start: 2024-05-30 | End: 2024-05-31 | Stop reason: HOSPADM

## 2024-05-30 RX ORDER — PRAZOSIN HYDROCHLORIDE 1 MG/1
1 CAPSULE ORAL NIGHTLY
Status: DISCONTINUED | OUTPATIENT
Start: 2024-05-30 | End: 2024-05-31 | Stop reason: HOSPADM

## 2024-05-30 RX ORDER — DESVENLAFAXINE 100 MG/1
100 TABLET, EXTENDED RELEASE ORAL DAILY
Status: DISCONTINUED | OUTPATIENT
Start: 2024-05-30 | End: 2024-05-31 | Stop reason: HOSPADM

## 2024-05-30 RX ORDER — PANTOPRAZOLE SODIUM 40 MG/1
40 TABLET, DELAYED RELEASE ORAL DAILY
Status: DISCONTINUED | OUTPATIENT
Start: 2024-05-30 | End: 2024-05-31 | Stop reason: HOSPADM

## 2024-05-30 RX ORDER — ALBUTEROL SULFATE 90 UG/1
2 AEROSOL, METERED RESPIRATORY (INHALATION) EVERY 4 HOURS PRN
Status: DISCONTINUED | OUTPATIENT
Start: 2024-05-30 | End: 2024-05-31 | Stop reason: HOSPADM

## 2024-05-30 RX ORDER — TOPIRAMATE 100 MG/1
100 TABLET, FILM COATED ORAL DAILY
Status: DISCONTINUED | OUTPATIENT
Start: 2024-05-30 | End: 2024-05-31 | Stop reason: HOSPADM

## 2024-05-30 RX ORDER — DIAZEPAM 5 MG/1
5 TABLET ORAL EVERY 8 HOURS PRN
Status: DISCONTINUED | OUTPATIENT
Start: 2024-05-30 | End: 2024-05-31 | Stop reason: HOSPADM

## 2024-05-30 RX ORDER — SUMATRIPTAN 50 MG/1
100 TABLET, FILM COATED ORAL ONCE AS NEEDED
Status: DISCONTINUED | OUTPATIENT
Start: 2024-05-30 | End: 2024-05-31 | Stop reason: HOSPADM

## 2024-05-30 RX ORDER — FLUOXETINE HYDROCHLORIDE 20 MG/1
40 CAPSULE ORAL 2 TIMES DAILY
Status: DISCONTINUED | OUTPATIENT
Start: 2024-05-30 | End: 2024-05-31 | Stop reason: HOSPADM

## 2024-05-30 RX ORDER — BUPROPION HYDROCHLORIDE 150 MG/1
150 TABLET ORAL DAILY
Status: DISCONTINUED | OUTPATIENT
Start: 2024-05-30 | End: 2024-05-31 | Stop reason: HOSPADM

## 2024-05-30 RX ADMIN — PRAZOSIN HYDROCHLORIDE 1 MG: 1 CAPSULE ORAL at 21:17

## 2024-05-30 RX ADMIN — FLUOXETINE HYDROCHLORIDE 40 MG: 20 CAPSULE ORAL at 21:16

## 2024-05-30 RX ADMIN — TRAZODONE HYDROCHLORIDE 100 MG: 100 TABLET ORAL at 21:39

## 2024-05-30 RX ADMIN — DESVENLAFAXINE 100 MG: 100 TABLET, EXTENDED RELEASE ORAL at 12:06

## 2024-05-30 RX ADMIN — FLUOXETINE HYDROCHLORIDE 40 MG: 20 CAPSULE ORAL at 12:05

## 2024-05-30 RX ADMIN — TRAZODONE HYDROCHLORIDE 100 MG: 100 TABLET ORAL at 01:21

## 2024-05-30 RX ADMIN — PANTOPRAZOLE SODIUM 40 MG: 40 TABLET, DELAYED RELEASE ORAL at 12:06

## 2024-05-30 RX ADMIN — DIAZEPAM 5 MG: 5 TABLET ORAL at 21:39

## 2024-05-30 RX ADMIN — LURASIDONE HYDROCHLORIDE 80 MG: 80 TABLET, FILM COATED ORAL at 12:06

## 2024-05-30 RX ADMIN — BUPROPION HYDROCHLORIDE 150 MG: 150 TABLET, EXTENDED RELEASE ORAL at 12:06

## 2024-05-30 RX ADMIN — TOPIRAMATE 100 MG: 100 TABLET, FILM COATED ORAL at 12:05

## 2024-05-30 RX ADMIN — METFORMIN HYDROCHLORIDE 500 MG: 500 TABLET, FILM COATED ORAL at 16:37
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
## 2024-05-30 NOTE — NURSING NOTE
"Pt arrived to unit voluntarily at 2353. Pt has been having increasing SI with a plan to die via carbon monoxide poisoning in her garage. Pt has a hx of anxiety, depression and PTSD. It was recently the anniversary of her 12 year old son's death and in a few days will be what would've been his 19th birthday. Pt has recently lost 4 family members in in the last 4 years. Her parents also just  after 50 years and both of them have cancer. She is having marital problems and states \"every single part of my life is stressful\". She has been so depressed that she hasn't been able to keep up with her hygiene and she says she no longer cares about herself, only her adult daughter and her boys. Pt reports that she feels \"frozen\". Pt hopes to return home to her  upon DC. She reports current SI with a hx of an OD attempt in 2023, but she is able to contract for safety whilst here. She denies HI or hallucinations. Rates anxiety 6, depression 10. CWA at bedside.   "

## 2024-05-30 NOTE — PLAN OF CARE
Goal Outcome Evaluation:  Plan of Care Reviewed With: patient  Patient Agreement with Plan of Care: agrees  Patient alert and oriented and calm and cooperative with staff. Patient denies S/I, H/I or hallucinations. Patient reports her mood has improved a little bit because she got some sleep and away from her current situation. Patient cooperative with treatment plan and attended groups. No inappropriate or aggressive behavior noted. Will continue to monitor.

## 2024-05-30 NOTE — ED TRIAGE NOTES
"\"Has hx. Of depression, has been having feelings of wanting to hurt self. Has made attempts in the past to hurt self.\"   "

## 2024-05-30 NOTE — ED PROVIDER NOTES
Time: 8:13 PM EDT  Date of encounter:  2024  Room number: 33/33  Independent Historian/Clinical History and Information was obtained by:   Patient    History is limited by: N/A    Chief Complaint: SI      History of Present Illness:  Patient is a 48 y.o. year old female who presents to the emergency department for evaluation of SI. Pt denies any HI or auditory or visual hallucinations. She reports that she has had an accumulation of many stressors in her life that have recently, particularly over the past 2 weeks, exacerbated her depression and anxiety.  This morning when she was doing a televisit with her therapist she describes the buildup of her emotions and stressors coming out and so strong that she voiced suicidal ideations.  Stressors include the loss of many family members recently, financial issues, and the anniversary of her son's death, and her son's birthday. She had 1 prior SI attempt via OD. Her plan this time is to sit in a running car in an enclosed space and die via carbon monoxide poisoning. She denies any auditory or visual hallucinations. She has had no recently illnesses and has not missed any of her psych medications.     HPI    Patient Care Team  Primary Care Provider: Aggie Snider DO    Past Medical History:     Allergies   Allergen Reactions    Aspirin Unknown - High Severity     Past Medical History:   Diagnosis Date    Allergic     Seasonal    Anxiety and depression     Asthma     Colon polyp     2 removed. No concerns    Headache     Current tx with Neurology    Hyperlipidemia     Hypertension     Irritable bowel syndrome     IUD (intrauterine device) in place     Obesity 2010    After fall and break leg in     Sleep apnea     Visual impairment 2015    Wear glasses with bifocal     Past Surgical History:   Procedure Laterality Date     SECTION      X2 2001 2004    COLONOSCOPY N/A 2022    Procedure: COLONOSCOPY WITH POLYPECTOMY;  Surgeon:  Neil Benavides MD;  Location: [facility] ENDOSCOPY;  Service: Gastroenterology;  Laterality: N/A;  COLON POLYPS    FRACTURE SURGERY      2009    TIBIA FRACTURE SURGERY       Family History   Problem Relation Age of Onset    Anxiety disorder Mother     Cancer Mother         Medullary Thyroid Cancer    COPD Mother     Diabetes Mother     Thyroid disease Mother         Medullary Thyroid Cancer    Hypertension Mother     Hyperlipidemia Mother     Depression Mother     Anxiety disorder Father     Cancer Father         Throat and Prostrate Cancer    Hyperlipidemia Father     Hypertension Father     Depression Father     Colon cancer Maternal Grandmother          around 40    Anxiety disorder Daughter     Depression Daughter     Depression Paternal Aunt         Suicide and Schizophrenia       Home Medications:  Prior to Admission medications    Medication Sig Start Date End Date Taking? Authorizing Provider   albuterol (PROVENTIL) 2.5 MG/0.5ML nebulizer solution Take 2.5 mg by nebulization Every 4 (Four) Hours As Needed for Wheezing.  Patient not taking: Reported on 3/28/2024 2/7/24   Aggie Snider DO   albuterol sulfate  (90 Base) MCG/ACT inhaler Inhale 2 puffs Every 4 (Four) Hours As Needed for Wheezing. 24   Aggie Snider DO   atorvastatin (LIPITOR) 40 MG tablet Take 1 tablet by mouth Daily.  Patient not taking: Reported on 3/28/2024    Vahid Zamora MD   desvenlafaxine (PRISTIQ) 50 MG 24 hr tablet  3/22/24   Vahid Zamora MD   diazePAM (VALIUM) 10 MG tablet Take  by mouth.    Vahid Zamora MD   Emgality 120 MG/ML auto-injector pen  24   Vahid Zamora MD   FLUoxetine (PROzac) 40 MG capsule Take 1 capsule by mouth Daily. 22   Vahid Zamora MD   hydrOXYzine (ATARAX) 25 MG tablet  23   Vahid Zamora MD   Levonorgestrel (MIRENA) 20 MCG/DAY intrauterine device IUD 1 each by Intrauterine route.    Vahid Zamora MD   lurasidone  "(LATUDA) 40 MG tablet tablet TAKE 1 TABLET BY MOUTH DAILY 3/19/24   Aggie Snider DO   metFORMIN (GLUCOPHAGE) 500 MG tablet Take 1 tablet by mouth. 1/26/24   Vahid Zamora MD   pantoprazole (PROTONIX) 40 MG EC tablet Take 1 tablet by mouth Daily. 3/28/24   Aggie Snider DO   phentermine 37.5 MG capsule Take 1 capsule by mouth Every Morning. 3/28/24   Aggie Snider DO   prazosin (MINIPRESS) 1 MG capsule  1/17/24   Vahid Zamora MD   SUMAtriptan (IMITREX) 100 MG tablet  5/2/22   Vahid Zamora MD   topiramate (TOPAMAX) 100 MG tablet Take 1 tablet by mouth Daily. 11/16/23   Vahid Zamora MD        Social History:   Social History     Tobacco Use    Smoking status: Never    Smokeless tobacco: Never   Vaping Use    Vaping status: Never Used   Substance Use Topics    Alcohol use: Not Currently     Comment: Social Drinking- 1 or 2 drinks every 6 months    Drug use: Never         Review of Systems:  Review of Systems   Constitutional:  Negative for chills and fever.   HENT:  Negative for congestion, ear pain and sore throat.    Eyes:  Negative for pain.   Respiratory:  Negative for cough, chest tightness and shortness of breath.    Cardiovascular:  Negative for chest pain.   Gastrointestinal:  Negative for abdominal pain, diarrhea, nausea and vomiting.   Genitourinary:  Negative for flank pain and hematuria.   Musculoskeletal:  Negative for joint swelling.   Skin:  Negative for pallor.   Neurological:  Negative for seizures and headaches.   Psychiatric/Behavioral:  Positive for self-injury, sleep disturbance and suicidal ideas. The patient is nervous/anxious.    All other systems reviewed and are negative.       Physical Exam:  /75 (BP Location: Left arm, Patient Position: Sitting)   Pulse 95   Temp 97.6 °F (36.4 °C) (Oral)   Resp 24   Ht 165.1 cm (65\")   Wt 97.5 kg (215 lb)   LMP  (LMP Unknown) Comment: \"has IUD\"  SpO2 97%   BMI 35.78 kg/m²     Physical Exam  Vitals and " nursing note reviewed.   Constitutional:       General: She is not in acute distress.     Appearance: Normal appearance. She is not ill-appearing or toxic-appearing.   HENT:      Head: Normocephalic and atraumatic.      Mouth/Throat:      Mouth: Mucous membranes are moist.   Eyes:      General: No scleral icterus.  Cardiovascular:      Rate and Rhythm: Normal rate and regular rhythm.      Pulses: Normal pulses.      Heart sounds: Normal heart sounds.   Pulmonary:      Effort: Pulmonary effort is normal. No respiratory distress.      Breath sounds: Normal breath sounds.   Abdominal:      General: Abdomen is flat.      Palpations: Abdomen is soft.      Tenderness: There is no abdominal tenderness.   Musculoskeletal:         General: No signs of injury. Normal range of motion.      Cervical back: Normal range of motion and neck supple.   Skin:     General: Skin is warm and dry.      Coloration: Skin is not jaundiced or pale.      Findings: No bruising or erythema.   Neurological:      Mental Status: She is alert and oriented to person, place, and time. Mental status is at baseline.   Psychiatric:         Thought Content: Thought content normal.         Judgment: Judgment normal.      Comments: PT is tearful                   Procedures:  Procedures      Medical Decision Making:      Comorbidities that affect care:    Anxiety, depression, hypertension, sleep apnea, obesity    External Notes reviewed:    Previous Clinic Note: Reviewed patient's visit with her primary care provider when she last discussed her recurrent major depression which was on 2/22/2024      The following orders were placed and all results were independently analyzed by me:  Orders Placed This Encounter   Procedures    [place] Draw    Comprehensive Metabolic Panel    Ethanol    Urine Drug Screen - Urine, Clean Catch    Acetaminophen Level    Salicylate Level    TSH    T4, Free    hCG, Serum, Qualitative    CBC Auto Differential    CBC & Differential     Green Top (Gel)    Lavender Top    Light Blue Top       Medications Given in the Emergency Department:  Medications   diazePAM (VALIUM) tablet 10 mg (10 mg Oral Given 5/29/24 2141)        ED Course:    ED Course as of 05/31/24 1429   Wed May 29, 2024   2206 Order placed for psych consult at this time. PT is agreeable to inpatient care.  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 PT states that she personally knew the  and EMS crew that arrived on scene, as well as the MD at hospital, she pt was able to convince all parties that is was accidental. She did this because she was afraid it would affect her career because [place] and was afraid of others finding out.       Additionally, her  is unaware that she is here seeking help. She advises she will call him once she has a final disposition/admission.  [MS]   2145 Benzodiazepine Screen, Urine(!): Positive  PT takes valium daily  [MS]   2145 THC Screen, Urine(!): Positive  PT admits to having a gummy last night to help her sleep. (It was ineffective). [MS]      ED Course User Index  [MS] Genie Coleman, YVON       Labs:    Lab Results (last 24 hours)       ** No results found for the last 24 hours. **             Imaging:    No Radiology Exams Resulted Within Past 24 Hours      Differential Diagnosis and Discussion:    Psychiatric: Differential diagnosis includes but is not limited to depression, psychosis, bipolar disorder, anxiety, manic episode, schizophrenia, and substance abuse.    All labs were reviewed and interpreted by me.    MDM               Patient Care Considerations:    PSYCH: I considered ordering anxiolytic and or antipsychotic medications, however patient was able to facilitate the medical screening exam and disposition without further medications.      Consultants/Shared Management Plan:    Hospitalist: I have discussed the case  with Dr. Marinelli who agrees to accept the patient for admission.    Social Determinants of Health:    Patient is independent, reliable, and has access to care.       Disposition and Care Coordination:    Psychiatric Admission: Through independent evaluation of the patient's history and physical and consultation with psychiatry, the patient meets criteria for admission to a psychiatric facility.        Final diagnoses:   Suicidal ideation   Depression, unspecified depression type        ED Disposition       ED Disposition   DC/Transfer to Behavioral Health Condition   Stable    Comment   --               This medical record created using voice recognition software.       Genie Coleman, APRN  05/31/24 1855

## 2024-05-31 VITALS
HEIGHT: 65 IN | DIASTOLIC BLOOD PRESSURE: 61 MMHG | WEIGHT: 215 LBS | TEMPERATURE: 98.7 F | RESPIRATION RATE: 16 BRPM | OXYGEN SATURATION: 100 % | BODY MASS INDEX: 35.82 KG/M2 | HEART RATE: 75 BPM | SYSTOLIC BLOOD PRESSURE: 100 MMHG

## 2024-05-31 RX ORDER — DESVENLAFAXINE SUCCINATE 50 MG/1
50 TABLET, EXTENDED RELEASE ORAL TAKE AS DIRECTED
Start: 2024-05-31

## 2024-05-31 RX ORDER — TRAZODONE HYDROCHLORIDE 100 MG/1
100 TABLET ORAL NIGHTLY PRN
Qty: 30 TABLET | Refills: 1 | Status: SHIPPED | OUTPATIENT
Start: 2024-05-31

## 2024-05-31 RX ORDER — BUPROPION HYDROCHLORIDE 150 MG/1
150 TABLET ORAL DAILY
Qty: 30 TABLET | Refills: 1 | Status: SHIPPED | OUTPATIENT
Start: 2024-06-01

## 2024-05-31 RX ADMIN — TOPIRAMATE 100 MG: 100 TABLET, FILM COATED ORAL at 08:32

## 2024-05-31 RX ADMIN — LURASIDONE HYDROCHLORIDE 80 MG: 80 TABLET, FILM COATED ORAL at 09:30

## 2024-05-31 RX ADMIN — BUPROPION HYDROCHLORIDE 150 MG: 150 TABLET, EXTENDED RELEASE ORAL at 08:32

## 2024-05-31 RX ADMIN — METFORMIN HYDROCHLORIDE 500 MG: 500 TABLET, FILM COATED ORAL at 08:31

## 2024-05-31 RX ADMIN — DESVENLAFAXINE 100 MG: 100 TABLET, EXTENDED RELEASE ORAL at 09:30

## 2024-05-31 RX ADMIN — PANTOPRAZOLE SODIUM 40 MG: 40 TABLET, DELAYED RELEASE ORAL at 08:31

## 2024-05-31 RX ADMIN — FLUOXETINE HYDROCHLORIDE 40 MG: 20 CAPSULE ORAL at 08:31

## 2024-05-31 NOTE — PLAN OF CARE
Goal Outcome Evaluation:  Plan of Care Reviewed With: patient  Patient Agreement with Plan of Care: agrees     Progress: improving.  Patient has been calm, cooperative and pleasant this shift, and spent some time in dayroom around snack time conversing with peers.  Patient rates depression at 4-5/10, anxiety at 6/10 when she is in her room, but states it increases to 10/10 when she goes to dayroom and socializes.  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  Patient states she wants to discuss medication adjustments and plan for her treatment with the doctor today and she was provided with encouragement.  Patient states she has no one but her  for emotional support and that extended family is in [place].  Patient states the dual anniversaries of her son's death and his birthday have also added to her stress, anxiety and depression increases.  Patient states she has a therapist and plans on continuing on with therapy. Patient is cooperative with care and pm medication and requested trazodone for sleep and her prn valium for increased anxiety.  Emotional support was provided and safe environment continues.                                    Zahra Chauhan is a 63 y.o. male who presents todayfor his medical conditions/complaints as noted below. Zahra Chauhan is here today c/o6 Month Follow-Up; Diabetes; and Discuss Labs    Routine follow up on PL he is doing pretty well. A1c was down to 7.4%.  :         HPI        Past Medical History:   Diagnosis Date    Blood in stool     CPAP (continuous positive airway pressure) dependence     Diabetes mellitus ([address]     Diabetic retinopathy ([address]     Hypercholesterolemia     Hypertension     Prolonged emergence from general anesthesia     Sleep apnea     Type II or unspecified type diabetes mellitus without mention of complication, not stated as uncontrolled       Past Surgical History:   Procedure Laterality Date    CATARACT REMOVAL WITH IMPLANT Left     EYE SURGERY Left     BLOOD CLOT EVACUATED FROM LEFT EYE    PILONIDAL CYST EXCISION  1981    LA CIRCUMCISION,OTHER,28+ D/O N/A 9/18/2018    CIRCUMCISION performed by Saúl Calle MD at [address]     No family history on file. Social History     Tobacco Use    Smoking status: Never Smoker    Smokeless tobacco: Never Used   Substance Use Topics    Alcohol use: No     Alcohol/week: 0.0 standard drinks      Current Outpatient Medications   Medication Sig Dispense Refill    metoprolol succinate (TOPROL XL) 25 MG extended release tablet Take 1 Tablet daily 90 tablet 3    hydrochlorothiazide (HYDRODIURIL) 25 MG tablet TAKE 1 TABLET BY MOUTH IN THE MORNING  30 tablet 0    SITagliptin (JANUVIA) 100 MG tablet Take 1 tablet by mouth daily 90 tablet 1    valsartan (DIOVAN) 160 MG tablet TAKE 1 TABLET BY MOUTH ONE TIME A DAY  30 tablet 0    pioglitazone (ACTOS) 45 MG tablet TAKE 1 TABLET DAILY 90 tablet 3    atorvastatin (LIPITOR) 40 MG tablet TAKE 1 TABLET DAILY 90 tablet 3    glyBURIDE-metformin (GLUCOVANCE) 2.5-500 MG per tablet TAKE 2 TABLETS TWICE A  tablet 3     No current facility-administered medications for this visit. Allergies   Allergen Reactions    Norvasc [Amlodipine Besylate] Swelling         Subjective:   Review of Systems   Constitutional: Negative for chills, diaphoresis, fatigue and fever. HENT: Negative for congestion and hearing loss. Eyes: Negative for visual disturbance. Respiratory: Negative for cough, shortness of breath and wheezing. Cardiovascular: Negative for chest pain, palpitations and leg swelling. Gastrointestinal: Negative for abdominal pain, blood in stool, constipation and diarrhea. Genitourinary: Negative for dysuria. Musculoskeletal: Negative for arthralgias, back pain, gait problem and neck pain. Skin: Negative for rash. Neurological: Negative for weakness, numbness and headaches. Psychiatric/Behavioral: Negative for dysphoric mood and sleep disturbance.       :   /66   Pulse 54   Ht 5' 9\" (1.753 m)   Wt (!) 301 lb 4.8 oz (136.7 kg)   SpO2 98%   BMI 44.49 kg/m²     Physical Exam  Constitutional:       General: He is not in acute distress. Appearance: He is well-developed. He is not diaphoretic. HENT:      Head: Normocephalic and atraumatic. Mouth/Throat:      Pharynx: No oropharyngeal exudate. Eyes:      General: No scleral icterus. Right eye: No discharge. Left eye: No discharge. Neck:      Musculoskeletal: Neck supple. Thyroid: No thyromegaly. Vascular: No carotid bruit. Cardiovascular:      Rate and Rhythm: Normal rate and regular rhythm. Heart sounds: Normal heart sounds. No murmur. No friction rub. No gallop. Pulmonary:      Effort: No respiratory distress. Breath sounds: Normal breath sounds. No wheezing or rales. Chest:      Chest wall: No tenderness. Abdominal:      Tenderness: There is no tenderness. Musculoskeletal:         General: No tenderness. Lymphadenopathy:      Cervical: No cervical adenopathy. Skin:     Findings: No rash.    Neurological:      Mental Status: He is alert and oriented to person, place, and time. Cranial Nerves: No cranial nerve deficit. Coordination: Coordination normal.   Psychiatric:         Behavior: Behavior normal.         Thought Content: Thought content normal.         Judgment: Judgment normal.         Assessment:       Diagnosis Orders   1. Type 2 diabetes mellitus without complication, without long-term current use of insulin ([facility])  Comprehensive Metabolic Panel    Lipid Panel    Hemoglobin A1C   2. Essential hypertension  CBC Auto Differential    Comprehensive Metabolic Panel    Lipid Panel   3. Hypercholesterolemia  Comprehensive Metabolic Panel    Lipid Panel         Plan:      Return in about 6 months (around 7/9/2020). Orders Placed This Encounter   Procedures    CBC Auto Differential     Standing Status:   Future     Standing Expiration Date:   1/9/2021    Comprehensive Metabolic Panel     Standing Status:   Future     Standing Expiration Date:   1/9/2021    Lipid Panel     Standing Status:   Future     Standing Expiration Date:   1/9/2021     Order Specific Question:   Is Patient Fasting?/# of Hours     Answer:   12 hour fast    Hemoglobin A1C     Standing Status:   Future     Standing Expiration Date:   1/9/2021     Orders Placed This Encounter   Medications    metoprolol succinate (TOPROL XL) 25 MG extended release tablet     Sig: Take 1 Tablet daily     Dispense:  90 tablet     Refill:  3     Declines flu shot.

## 2024-05-31 NOTE — DISCHARGE SUMMARY
[facility]         DISCHARGE SUMMARY    Patient Name: Rama Mccoy  : 1975  MRN: [id number]    Date of Admission: 2024  Date of Discharge: 2024  Primary Care Physician: Aggie Snider DO    Consults       No orders found from 2024 to 2024.            Presenting Problem:   Depression with suicidal ideation [F32.A, R45.851]    Active and Resolved Hospital Problems:  Active Hospital Problems    Diagnosis POA   • Severe recurrent major depression without psychotic features [F33.2] Yes   • Asthma [J45.909] Yes   • Class 2 obesity with body mass index (BMI) of 35.0 to 35.9 in adult [E66.9, Z68.35] Not Applicable   • Marijuana abuse [F12.10] Yes   • Gastroesophageal reflux disease without esophagitis [K21.9] Yes   • ROMEO (generalized anxiety disorder) [F41.1] Yes   • Migraine without status migrainosus, not intractable [G43.909] Yes   • Mixed hyperlipidemia [E78.2] Yes      Resolved Hospital Problems   No resolved problems to display.         Hospital Course     Hospital Course:  Rama Mccoy is a 48 y.o. female voluntary basis for exacerbation depression with suicidal ideations without plan or intent.  Patient reported being increasingly depressed recently became more emotional over the last week or so.  The anniversary of her son's death date was this week, and his birthday was also supposed to be within the week.    Patient had been on desvenlafaxine 150 mg daily with fluoxetine 80 mg daily.  She is not sure if combination these medications has been effective, but has been on fluoxetine for longer and it has been more effective in the past.  She wanted to remain on the fluoxetine.  We discussed tapering and stopping desvenlafaxine.  Her dose was decreased to 100 mg and she will remain on this for the next 2 days and then decrease it to 50 mg for 2 weeks and then stop or based on instructions from her outpatient provider.  She has an appointment scheduled with her  "outpatient provider within the 14-day window.    We discussed addition of medications to her regimen of fluoxetine.  She reported a trial of bupropion number years ago does not think she ever gave it a fair chance.  Discussed the fact that this medication works differently and it may be good to initiate a trial.  She was agreeable.  She is tolerating the medication so far.    He has been on Latuda, and Valium as needed for anxiety and panic attacks.  These medications were continued.    Patient had difficulty sleeping and trazodone was added Mrs. Jordi pruitt.  She reports sleep improvement has done wonders for her mood.    Patient was denying suicidal ideation on initial presentation.  She also had no plan or intent of committing suicide when she came into the hospital but was having some vague thoughts of this.  Patient's mood and affect has improved.  She had been calm and cooperative.  She has had no psychomotor restlessness or agitation.  Patient feels with the plan for medication changes as well as improved sleep with trazodone that she is ready for discharge.  The patient is here voluntarily and can be managed as an outpatient will discharge        On day of discharge patient is calm, cooperative, engaging, and has no acute agitation or restlessness.  Speech is normal rate and volume and language is appropriate.  Mood is described as \"better, good\" and affect is slightly blunted.  Thought processes are goal directed, future oriented.  Thought content is negative for suicidal homicidal ideations, no hallucinations.  Insight is intact, and judgment is appropriate.      DISCHARGE Follow Up Recommendations for labs and diagnostics: Routine health maintenance for primary care, lipid and glucose monitoring, medication management and individual therapy with [place] [facility] as scheduled      Day of Discharge     Vital Signs:  Temp:  [97.5 °F (36.4 °C)-98.7 °F (37.1 °C)] 98.7 °F (37.1 °C)  Heart Rate:  [75-76] " 75  Resp:  [16] 16  BP: (100-103)/(61-70) 100/61      Pertinent  and/or Most Recent Results     LAB RESULTS:      Lab 05/29/24 2057   WBC 9.90   HEMOGLOBIN 13.3   HEMATOCRIT 40.2   PLATELETS 282   NEUTROS ABS 6.13   IMMATURE GRANS (ABS) 0.03   LYMPHS ABS 2.55   MONOS ABS 0.74   EOS ABS 0.39   MCV 84.1         Lab 05/29/24 2057   SODIUM 137   POTASSIUM 4.2   CHLORIDE 102   CO2 21.5*   ANION GAP 13.5   BUN 12   CREATININE 0.90   EGFR 78.5   GLUCOSE 82   CALCIUM 9.0   TSH 3.890         Lab 05/29/24 2057   TOTAL PROTEIN 7.4   ALBUMIN 3.9   GLOBULIN 3.5   ALT (SGPT) 21   AST (SGOT) 15   BILIRUBIN 0.2   ALK PHOS 92                                     Lab 05/29/24 2057   ETHANOL PCT <0.010   ETHANOL MGDL <10         Lab 05/29/24 2127   AMPH/METHAM SCREEN, URINE Negative   BENZODIAZEPINE SCREEN, URINE Positive*   COCAINE SCREEN, URINE Negative   OPIATES Negative   THC URINE SCREEN Positive*   METHADONE SCREEN, URINE Negative     Brief Urine Lab Results  (Last result in the past 365 days)        Color   Clarity   Blood   Leuk Est   Nitrite   Protein   CREAT   Urine HCG        05/29/24 2127               Negative                                   Imaging Results (Last 7 Days)       ** No results found for the last 168 hours. **             Labs Pending at Discharge:           Discharge Details        Discharge Medications        New Medications        Instructions Start Date   buPROPion  MG 24 hr tablet  Commonly known as: WELLBUTRIN XL   150 mg, Oral, Daily   Start Date: June 1, 2024     traZODone 100 MG tablet  Commonly known as: DESYREL   100 mg, Oral, Nightly PRN             Changes to Medications        Instructions Start Date   desvenlafaxine 50 MG 24 hr tablet  Commonly known as: PRISTIQ  What changed:   when to take this  additional instructions   50 mg, Oral, Take As Directed, Take 2 tabs daily for 2 days.  After 2 days begin taking 1 tab daily for 2 weeks then stop or until seen by OP provider       Lurasidone HCl 80 MG tablet tablet  Commonly known as: LATUDA  What changed: Another medication with the same name was removed. Continue taking this medication, and follow the directions you see here.   80 mg, Oral, Daily             Continue These Medications        Instructions Start Date   albuterol 2.5 MG/0.5ML nebulizer solution  Commonly known as: PROVENTIL   2.5 mg, Nebulization, Every 4 Hours PRN      albuterol sulfate  (90 Base) MCG/ACT inhaler  Commonly known as: PROVENTIL HFA;VENTOLIN HFA;PROAIR HFA   2 puffs, Inhalation, Every 4 Hours PRN      Emgality 120 MG/ML auto-injector pen  Generic drug: galcanezumab-gnlm       FLUoxetine 40 MG capsule  Commonly known as: PROzac   40 mg, Oral, 2 Times Daily      hydrOXYzine 50 MG tablet  Commonly known as: ATARAX   Take 1 tablet by mouth Every 8 (Eight) Hours As Needed.      Levonorgestrel 20 MCG/DAY intrauterine device IUD  Commonly known as: MIRENA   1 each, Intrauterine      metFORMIN 500 MG tablet  Commonly known as: GLUCOPHAGE   500 mg, Oral, 2 Times Daily With Meals      pantoprazole 40 MG EC tablet  Commonly known as: PROTONIX   40 mg, Oral, Daily      prazosin 1 MG capsule  Commonly known as: MINIPRESS       SUMAtriptan 100 MG tablet  Commonly known as: IMITREX   100 mg, Oral, Once As Needed      topiramate 100 MG tablet  Commonly known as: TOPAMAX   100 mg, Oral, Daily             Stop These Medications      phentermine 37.5 MG capsule              Allergies   Allergen Reactions   • Aspirin Unknown - High Severity         Discharge Disposition:  Home or Self Care    Diet:  Hospital:  Diet Order   Procedures   • Diet: Regular/House; Texture: Regular (IDDSI 7); Fluid Consistency: Thin (IDDSI 0)         Discharge Activity: Ad carina.  Activity Instructions       Activity as Tolerated              Discharge Condition: Stable    CODE STATUS:  Code Status and Medical Interventions:   Ordered at: 05/29/24 7785     Code Status (Patient has no pulse and is  not breathing):    CPR (Attempt to Resuscitate)     Medical Interventions (Patient has pulse or is breathing):    Full Support         Future Appointments   Date Time Provider Department Center   6/28/2024  1:15 PM Aggie Snider DO [facility]       Additional Instructions for the Follow-ups that You Need to Schedule       Discharge Follow-up with PCP   As directed       Currently Documented PCP:    Aggie Snider DO    PCP Phone Number:    [phone]     Follow Up Details: As scheduled        Discharge Follow-up with Specified Provider: Giovani doll as scheduled   As directed      To: [place] [facility] as scheduled                Time spent on Discharge including face to face service: 30 minutes    Part of this note may be an electronic transcription/translation of spoken language to printed text using the Dragon dictation system.        Electronically signed by Marcellus Negron MD, 05/31/24, 10:45 AM EDT.

## 2024-06-06 ENCOUNTER — POP HEALTH PHARMACY (OUTPATIENT)
Dept: PHARMACY | Facility: OTHER | Age: 49
End: 2024-06-06
Payer: MEDICARE

## 2024-06-12 DIAGNOSIS — F33.2 SEVERE RECURRENT MAJOR DEPRESSION WITHOUT PSYCHOTIC FEATURES: Primary | ICD-10-CM

## 2024-06-25 ENCOUNTER — POP HEALTH PHARMACY (OUTPATIENT)
Dept: PHARMACY | Facility: OTHER | Age: 49
End: 2024-06-25
Payer: MEDICARE

## 2024-06-28 ENCOUNTER — OFFICE VISIT (OUTPATIENT)
Dept: FAMILY MEDICINE CLINIC | Facility: CLINIC | Age: 49
End: 2024-06-28
Payer: MEDICARE

## 2024-06-28 VITALS
DIASTOLIC BLOOD PRESSURE: 74 MMHG | BODY MASS INDEX: 36.55 KG/M2 | SYSTOLIC BLOOD PRESSURE: 113 MMHG | OXYGEN SATURATION: 97 % | RESPIRATION RATE: 8 BRPM | TEMPERATURE: 98.9 F | HEIGHT: 65 IN | WEIGHT: 219.4 LBS | HEART RATE: 80 BPM

## 2024-06-28 DIAGNOSIS — Z79.899 MEDICATION MANAGEMENT: ICD-10-CM

## 2024-06-28 DIAGNOSIS — Z79.899 ENCOUNTER FOR MEDICATION MANAGEMENT: Primary | ICD-10-CM

## 2024-06-28 DIAGNOSIS — F41.1 GAD (GENERALIZED ANXIETY DISORDER): Chronic | ICD-10-CM

## 2024-06-28 DIAGNOSIS — E66.01 CLASS 2 SEVERE OBESITY DUE TO EXCESS CALORIES WITH SERIOUS COMORBIDITY AND BODY MASS INDEX (BMI) OF 36.0 TO 36.9 IN ADULT: Chronic | ICD-10-CM

## 2024-06-28 PROBLEM — F32.A DEPRESSION WITH SUICIDAL IDEATION: Status: RESOLVED | Noted: 2024-05-29 | Resolved: 2024-06-28

## 2024-06-28 PROBLEM — F33.2 SEVERE RECURRENT MAJOR DEPRESSION WITHOUT PSYCHOTIC FEATURES: Chronic | Status: ACTIVE | Noted: 2024-05-30

## 2024-06-28 PROBLEM — F12.10 MARIJUANA ABUSE: Status: RESOLVED | Noted: 2024-05-30 | Resolved: 2024-06-28

## 2024-06-28 PROBLEM — R45.851 DEPRESSION WITH SUICIDAL IDEATION: Status: RESOLVED | Noted: 2024-05-29 | Resolved: 2024-06-28

## 2024-06-28 PROBLEM — E66.812 CLASS 2 OBESITY WITH BODY MASS INDEX (BMI) OF 35.0 TO 35.9 IN ADULT: Status: RESOLVED | Noted: 2024-05-30 | Resolved: 2024-06-28

## 2024-06-28 PROBLEM — E66.9 CLASS 2 OBESITY WITH BODY MASS INDEX (BMI) OF 35.0 TO 35.9 IN ADULT: Status: RESOLVED | Noted: 2024-05-30 | Resolved: 2024-06-28

## 2024-06-28 LAB
AMPHET+METHAMPHET UR QL: NEGATIVE
AMPHETAMINE INTERNAL CONTROL: ABNORMAL
AMPHETAMINES UR QL: NEGATIVE
BARBITURATE INTERNAL CONTROL: ABNORMAL
BARBITURATES UR QL SCN: NEGATIVE
BENZODIAZ UR QL SCN: POSITIVE
BENZODIAZEPINE INTERNAL CONTROL: ABNORMAL
BUPRENORPHINE INTERNAL CONTROL: ABNORMAL
BUPRENORPHINE SERPL-MCNC: NEGATIVE NG/ML
CANNABINOIDS SERPL QL: NEGATIVE
COCAINE INTERNAL CONTROL: ABNORMAL
COCAINE UR QL: NEGATIVE
EXPIRATION DATE: ABNORMAL
Lab: ABNORMAL
MDMA (ECSTASY) INTERNAL CONTROL: ABNORMAL
MDMA UR QL SCN: NEGATIVE
METHADONE INTERNAL CONTROL: ABNORMAL
METHADONE UR QL SCN: NEGATIVE
METHAMPHETAMINE INTERNAL CONTROL: ABNORMAL
MORPHINE INTERNAL CONTROL: ABNORMAL
MORPHINE/OPIATES SCREEN, URINE: NEGATIVE
OXYCODONE INTERNAL CONTROL: ABNORMAL
OXYCODONE UR QL SCN: NEGATIVE
PCP UR QL SCN: NEGATIVE
PHENCYCLIDINE INTERNAL CONTROL: ABNORMAL
THC INTERNAL CONTROL: ABNORMAL

## 2024-06-28 PROCEDURE — 1126F AMNT PAIN NOTED NONE PRSNT: CPT | Performed by: FAMILY MEDICINE

## 2024-06-28 PROCEDURE — 1159F MED LIST DOCD IN RCRD: CPT | Performed by: FAMILY MEDICINE

## 2024-06-28 PROCEDURE — 1160F RVW MEDS BY RX/DR IN RCRD: CPT | Performed by: FAMILY MEDICINE

## 2024-06-28 PROCEDURE — 99214 OFFICE O/P EST MOD 30 MIN: CPT | Performed by: FAMILY MEDICINE

## 2024-06-28 PROCEDURE — G2211 COMPLEX E/M VISIT ADD ON: HCPCS | Performed by: FAMILY MEDICINE

## 2024-06-28 NOTE — ASSESSMENT & PLAN NOTE
Patient's (Body mass index is 36.51 kg/m².) indicates that they are morbidly/severely obese (BMI > 40 or > 35 with obesity - related health condition) with health conditions that include hypertension and dyslipidemias . Weight is improving with treatment. BMI  is above average; BMI management plan is completed. We discussed low calorie, low carb based diet program, portion control, and increasing exercise.

## 2024-06-28 NOTE — ASSESSMENT & PLAN NOTE
Psychological condition is improving with treatment.  Continue current treatment regimen.  Psychological condition  will be reassessed in 6 months.
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
## 2024-07-05 ENCOUNTER — EXTERNAL PBMM DATA (OUTPATIENT)
Dept: PHARMACY | Facility: OTHER | Age: 49
End: 2024-07-05
Payer: MEDICARE

## 2024-07-30 ENCOUNTER — EXTERNAL PBMM DATA (OUTPATIENT)
Dept: PHARMACY | Facility: OTHER | Age: 49
End: 2024-07-30
Payer: MEDICARE

## 2024-08-19 RX ORDER — PHENTERMINE HYDROCHLORIDE 37.5 MG/1
37.5 CAPSULE ORAL EVERY MORNING
Qty: 30 CAPSULE | Refills: 0 | Status: SHIPPED | OUTPATIENT
Start: 2024-08-19

## 2024-08-20 NOTE — TELEPHONE ENCOUNTER
Please get the patient on at the end of next month.  At that time she must have lost weight or we cannot continue this medication.

## 2024-09-04 ENCOUNTER — EXTERNAL PBMM DATA (OUTPATIENT)
Dept: PHARMACY | Facility: OTHER | Age: 49
End: 2024-09-04
Payer: MEDICARE

## 2024-09-26 ENCOUNTER — EXTERNAL PBMM DATA (OUTPATIENT)
Dept: PHARMACY | Facility: OTHER | Age: 49
End: 2024-09-26
Payer: MEDICARE

## 2024-11-05 DIAGNOSIS — K21.9 GASTROESOPHAGEAL REFLUX DISEASE WITHOUT ESOPHAGITIS: ICD-10-CM

## 2024-11-05 RX ORDER — PANTOPRAZOLE SODIUM 40 MG/1
40 TABLET, DELAYED RELEASE ORAL DAILY
Qty: 90 TABLET | Refills: 1 | Status: SHIPPED | OUTPATIENT
Start: 2024-11-05

## 2024-12-12 ENCOUNTER — POP HEALTH PHARMACY (OUTPATIENT)
Dept: PHARMACY | Facility: OTHER | Age: 49
End: 2024-12-12
Payer: MEDICARE

## 2025-01-31 ENCOUNTER — OFFICE VISIT (OUTPATIENT)
Dept: FAMILY MEDICINE CLINIC | Facility: CLINIC | Age: 50
End: 2025-01-31
Payer: MEDICARE

## 2025-01-31 VITALS
SYSTOLIC BLOOD PRESSURE: 115 MMHG | RESPIRATION RATE: 18 BRPM | HEART RATE: 105 BPM | BODY MASS INDEX: 36.49 KG/M2 | OXYGEN SATURATION: 100 % | WEIGHT: 219 LBS | TEMPERATURE: 97.8 F | HEIGHT: 65 IN | DIASTOLIC BLOOD PRESSURE: 71 MMHG

## 2025-01-31 DIAGNOSIS — E78.2 MIXED HYPERLIPIDEMIA: Chronic | ICD-10-CM

## 2025-01-31 DIAGNOSIS — Z00.00 ANNUAL PHYSICAL EXAM: ICD-10-CM

## 2025-01-31 DIAGNOSIS — E66.01 CLASS 2 SEVERE OBESITY DUE TO EXCESS CALORIES WITH SERIOUS COMORBIDITY AND BODY MASS INDEX (BMI) OF 36.0 TO 36.9 IN ADULT: Chronic | ICD-10-CM

## 2025-01-31 DIAGNOSIS — Z71.3 ENCOUNTER FOR WEIGHT LOSS COUNSELING: Primary | ICD-10-CM

## 2025-01-31 DIAGNOSIS — E66.812 CLASS 2 SEVERE OBESITY DUE TO EXCESS CALORIES WITH SERIOUS COMORBIDITY AND BODY MASS INDEX (BMI) OF 36.0 TO 36.9 IN ADULT: Chronic | ICD-10-CM

## 2025-01-31 PROBLEM — F33.2 SEVERE RECURRENT MAJOR DEPRESSION WITHOUT PSYCHOTIC FEATURES: Chronic | Status: RESOLVED | Noted: 2024-05-30 | Resolved: 2025-01-31

## 2025-01-31 PROCEDURE — 1160F RVW MEDS BY RX/DR IN RCRD: CPT | Performed by: FAMILY MEDICINE

## 2025-01-31 PROCEDURE — 1126F AMNT PAIN NOTED NONE PRSNT: CPT | Performed by: FAMILY MEDICINE

## 2025-01-31 PROCEDURE — 1159F MED LIST DOCD IN RCRD: CPT | Performed by: FAMILY MEDICINE

## 2025-01-31 PROCEDURE — G2211 COMPLEX E/M VISIT ADD ON: HCPCS | Performed by: FAMILY MEDICINE

## 2025-01-31 PROCEDURE — 99214 OFFICE O/P EST MOD 30 MIN: CPT | Performed by: FAMILY MEDICINE

## 2025-01-31 RX ORDER — METOPROLOL TARTRATE 50 MG
50 TABLET ORAL 2 TIMES DAILY
COMMUNITY
Start: 2024-12-10 | End: 2025-06-08

## 2025-01-31 RX ORDER — TOPIRAMATE 100 MG/1
100 TABLET, FILM COATED ORAL 2 TIMES DAILY
Qty: 60 TABLET | Refills: 2 | Status: SHIPPED | OUTPATIENT
Start: 2025-02-07

## 2025-01-31 RX ORDER — AMITRIPTYLINE HYDROCHLORIDE 50 MG/1
50 TABLET ORAL DAILY
COMMUNITY

## 2025-01-31 RX ORDER — PHENTERMINE HYDROCHLORIDE 37.5 MG/1
37.5 CAPSULE ORAL EVERY MORNING
Qty: 30 CAPSULE | Refills: 2 | Status: SHIPPED | OUTPATIENT
Start: 2025-01-31

## 2025-01-31 RX ORDER — TOPIRAMATE 50 MG/1
TABLET, FILM COATED ORAL
Qty: 21 TABLET | Refills: 0 | Status: SHIPPED | OUTPATIENT
Start: 2025-01-31
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
## 2025-02-01 NOTE — ASSESSMENT & PLAN NOTE
The patient was prescribed an increase in her Topamax as well as phentermine to be taken as directed.  Will see her back in 3 months and at that time reevaluate her weight loss and manage according to presentation.

## 2025-02-01 NOTE — ASSESSMENT & PLAN NOTE
Patient's (Body mass index is 36.44 kg/m².) indicates that they are morbidly/severely obese (BMI > 40 or > 35 with obesity - related health condition) with health conditions that include hypertension . Weight is unchanged. BMI  is above average; BMI management plan is completed. We discussed low calorie, low carb based diet program, portion control, and increasing exercise.

## 2025-04-21 ENCOUNTER — PATIENT MESSAGE (OUTPATIENT)
Dept: FAMILY MEDICINE CLINIC | Facility: CLINIC | Age: 50
End: 2025-04-21
Payer: MEDICARE

## 2025-04-23 ENCOUNTER — PATIENT MESSAGE (OUTPATIENT)
Dept: FAMILY MEDICINE CLINIC | Facility: CLINIC | Age: 50
End: 2025-04-23
Payer: MEDICARE

## 2025-04-23 DIAGNOSIS — E66.812 CLASS 2 SEVERE OBESITY DUE TO EXCESS CALORIES WITH SERIOUS COMORBIDITY AND BODY MASS INDEX (BMI) OF 36.0 TO 36.9 IN ADULT: Primary | Chronic | ICD-10-CM

## 2025-04-23 DIAGNOSIS — E66.01 CLASS 2 SEVERE OBESITY DUE TO EXCESS CALORIES WITH SERIOUS COMORBIDITY AND BODY MASS INDEX (BMI) OF 36.0 TO 36.9 IN ADULT: Primary | Chronic | ICD-10-CM

## 2025-04-28 ENCOUNTER — TELEPHONE (OUTPATIENT)
Dept: FAMILY MEDICINE CLINIC | Facility: CLINIC | Age: 50
End: 2025-04-28
Payer: MEDICARE

## 2025-04-28 NOTE — TELEPHONE ENCOUNTER
Caller: Rama Mccoy    Relationship to patient: Self    Best call back number: [phone]     Patient is needing: PATIENT HAS A VISIT TODAY BUT STATES SHE HAS NOT COMPLETED HER LAB WORK AND SHE IS GETTING OVER A VIRUS. PATIENT WANTS TO KNOW IF SHE SHOULD STILL COME IN TODAY OR BE RESCHEDULED. PLEASE CALL

## 2025-05-05 NOTE — TELEPHONE ENCOUNTER
Labs from [facility] are in media tab from 5/2/25.  Can you review and advise patient?

## 2025-05-23 ENCOUNTER — OFFICE VISIT (OUTPATIENT)
Dept: FAMILY MEDICINE CLINIC | Facility: CLINIC | Age: 50
End: 2025-05-23
Payer: MEDICARE

## 2025-05-23 VITALS
TEMPERATURE: 97.9 F | RESPIRATION RATE: 17 BRPM | HEART RATE: 99 BPM | BODY MASS INDEX: 38.22 KG/M2 | HEIGHT: 65 IN | WEIGHT: 229.4 LBS | OXYGEN SATURATION: 94 % | DIASTOLIC BLOOD PRESSURE: 71 MMHG | SYSTOLIC BLOOD PRESSURE: 118 MMHG

## 2025-05-23 DIAGNOSIS — E66.01 CLASS 2 SEVERE OBESITY DUE TO EXCESS CALORIES WITH SERIOUS COMORBIDITY AND BODY MASS INDEX (BMI) OF 38.0 TO 38.9 IN ADULT: ICD-10-CM

## 2025-05-23 DIAGNOSIS — E66.812 CLASS 2 SEVERE OBESITY DUE TO EXCESS CALORIES WITH SERIOUS COMORBIDITY AND BODY MASS INDEX (BMI) OF 38.0 TO 38.9 IN ADULT: ICD-10-CM

## 2025-05-23 DIAGNOSIS — Z00.00 MEDICARE ANNUAL WELLNESS VISIT, SUBSEQUENT: Primary | ICD-10-CM

## 2025-05-23 DIAGNOSIS — F41.1 GAD (GENERALIZED ANXIETY DISORDER): Chronic | ICD-10-CM

## 2025-05-23 DIAGNOSIS — E78.2 MIXED HYPERLIPIDEMIA: Chronic | ICD-10-CM

## 2025-05-23 RX ORDER — DIAZEPAM 10 MG/1
10 TABLET ORAL EVERY 8 HOURS PRN
COMMUNITY

## 2025-05-23 NOTE — ASSESSMENT & PLAN NOTE
Lipid abnormalities are improving with treatment    Plan:  Continue same medication/s without change.      Counseled patient on lifestyle modifications to help control hyperlipidemia.   Cholesterol lowering dietary information shared with patient.    Patient Treatment Goals:   LDL goal is under 100    Followup in 6 months.    The 10-year ASCVD risk score (Lemuel ARNDT, et al., 2019) is: 1.1%    Values used to calculate the score:      Age: 49 years      Sex: Female      Is Non- : No      Diabetic: No      Tobacco smoker: No      Systolic Blood Pressure: 118 mmHg      Is BP treated: No      HDL Cholesterol: 76 mg/dL      Total Cholesterol: 282 mg/dL
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
## 2025-06-08 DIAGNOSIS — E66.01 CLASS 2 SEVERE OBESITY DUE TO EXCESS CALORIES WITH SERIOUS COMORBIDITY AND BODY MASS INDEX (BMI) OF 36.0 TO 36.9 IN ADULT: Chronic | ICD-10-CM

## 2025-06-08 DIAGNOSIS — E66.812 CLASS 2 SEVERE OBESITY DUE TO EXCESS CALORIES WITH SERIOUS COMORBIDITY AND BODY MASS INDEX (BMI) OF 36.0 TO 36.9 IN ADULT: Chronic | ICD-10-CM

## 2025-06-08 RX ORDER — TOPIRAMATE 100 MG/1
100 TABLET, FILM COATED ORAL 2 TIMES DAILY
Qty: 180 TABLET | Refills: 1 | Status: SHIPPED | OUTPATIENT
Start: 2025-06-08

## 2025-06-08 RX ORDER — ROSUVASTATIN CALCIUM 10 MG/1
10 TABLET, COATED ORAL NIGHTLY
Qty: 90 TABLET | Refills: 1 | Status: SHIPPED | OUTPATIENT
Start: 2025-06-08

## 2025-06-08 RX ORDER — UBIDECARENONE 75 MG
1 CAPSULE ORAL NIGHTLY
Qty: 90 EACH | Refills: 1 | Status: SHIPPED | OUTPATIENT
Start: 2025-06-08

## 2025-06-15 NOTE — ASSESSMENT & PLAN NOTE
Patient's (Body mass index is 38.17 kg/m².) indicates that they are morbidly/severely obese (BMI > 40 or > 35 with obesity - related health condition) with health conditions that include dyslipidemias . Weight is worsening. BMI  is above average; BMI management plan is completed. We discussed low calorie, low carb based diet program, portion control, and increasing exercise.

## 2025-06-16 DIAGNOSIS — F41.1 GAD (GENERALIZED ANXIETY DISORDER): Primary | Chronic | ICD-10-CM

## 2025-07-06 DIAGNOSIS — K21.9 GASTROESOPHAGEAL REFLUX DISEASE WITHOUT ESOPHAGITIS: ICD-10-CM

## 2025-07-07 RX ORDER — PANTOPRAZOLE SODIUM 40 MG/1
40 TABLET, DELAYED RELEASE ORAL DAILY
Qty: 90 TABLET | Refills: 1 | Status: SHIPPED | OUTPATIENT
Start: 2025-07-07

## 2025-07-21 ENCOUNTER — TELEPHONE (OUTPATIENT)
Dept: FAMILY MEDICINE CLINIC | Facility: CLINIC | Age: 50
End: 2025-07-21

## 2025-08-04 ENCOUNTER — LAB (OUTPATIENT)
Dept: LAB | Facility: HOSPITAL | Age: 50
End: 2025-08-04
Payer: MEDICARE

## 2025-08-04 ENCOUNTER — OFFICE VISIT (OUTPATIENT)
Dept: FAMILY MEDICINE CLINIC | Facility: CLINIC | Age: 50
End: 2025-08-04
Payer: MEDICARE

## 2025-08-04 VITALS
HEIGHT: 65 IN | TEMPERATURE: 97.7 F | DIASTOLIC BLOOD PRESSURE: 69 MMHG | BODY MASS INDEX: 34.99 KG/M2 | OXYGEN SATURATION: 96 % | WEIGHT: 210 LBS | HEART RATE: 81 BPM | RESPIRATION RATE: 17 BRPM | SYSTOLIC BLOOD PRESSURE: 106 MMHG

## 2025-08-04 DIAGNOSIS — E55.9 VITAMIN D DEFICIENCY: ICD-10-CM

## 2025-08-04 DIAGNOSIS — F33.1 MODERATE EPISODE OF RECURRENT MAJOR DEPRESSIVE DISORDER: Chronic | ICD-10-CM

## 2025-08-04 DIAGNOSIS — D50.9 IRON DEFICIENCY ANEMIA, UNSPECIFIED IRON DEFICIENCY ANEMIA TYPE: ICD-10-CM

## 2025-08-04 DIAGNOSIS — E66.09 CLASS 1 OBESITY DUE TO EXCESS CALORIES WITH SERIOUS COMORBIDITY IN ADULT, UNSPECIFIED BMI: ICD-10-CM

## 2025-08-04 DIAGNOSIS — F41.1 GAD (GENERALIZED ANXIETY DISORDER): Chronic | ICD-10-CM

## 2025-08-04 DIAGNOSIS — E66.811 CLASS 1 OBESITY DUE TO EXCESS CALORIES WITH SERIOUS COMORBIDITY IN ADULT, UNSPECIFIED BMI: ICD-10-CM

## 2025-08-04 DIAGNOSIS — R68.82 LOW LIBIDO: ICD-10-CM

## 2025-08-04 DIAGNOSIS — D50.9 IRON DEFICIENCY ANEMIA, UNSPECIFIED IRON DEFICIENCY ANEMIA TYPE: Primary | ICD-10-CM

## 2025-08-04 LAB
25(OH)D3 SERPL-MCNC: 67.6 NG/ML (ref 30–100)
ESTRADIOL SERPL HS-MCNC: 47.7 PG/ML
FOLATE SERPL-MCNC: 11.9 NG/ML (ref 4.78–24.2)
FSH SERPL-ACNC: 3.34 MIU/ML
IRON 24H UR-MRATE: 45 MCG/DL (ref 37–145)
IRON SATN MFR SERPL: 10 % (ref 20–50)
LH SERPL-ACNC: 5.83 MIU/ML
TESTOST SERPL-MCNC: 18.9 NG/DL (ref 2.9–40.8)
TIBC SERPL-MCNC: 454 MCG/DL (ref 298–536)
TRANSFERRIN SERPL-MCNC: 305 MG/DL (ref 200–360)
VIT B12 BLD-MCNC: 409 PG/ML (ref 211–946)

## 2025-08-04 PROCEDURE — 84466 ASSAY OF TRANSFERRIN: CPT

## 2025-08-04 PROCEDURE — 84403 ASSAY OF TOTAL TESTOSTERONE: CPT

## 2025-08-04 PROCEDURE — 36415 COLL VENOUS BLD VENIPUNCTURE: CPT

## 2025-08-04 PROCEDURE — 83002 ASSAY OF GONADOTROPIN (LH): CPT

## 2025-08-04 PROCEDURE — 1126F AMNT PAIN NOTED NONE PRSNT: CPT | Performed by: NURSE PRACTITIONER

## 2025-08-04 PROCEDURE — 82306 VITAMIN D 25 HYDROXY: CPT

## 2025-08-04 PROCEDURE — 99214 OFFICE O/P EST MOD 30 MIN: CPT | Performed by: NURSE PRACTITIONER

## 2025-08-04 PROCEDURE — 83540 ASSAY OF IRON: CPT

## 2025-08-04 PROCEDURE — 82607 VITAMIN B-12: CPT

## 2025-08-04 PROCEDURE — 82670 ASSAY OF TOTAL ESTRADIOL: CPT

## 2025-08-04 PROCEDURE — 82746 ASSAY OF FOLIC ACID SERUM: CPT

## 2025-08-04 PROCEDURE — 1160F RVW MEDS BY RX/DR IN RCRD: CPT | Performed by: NURSE PRACTITIONER

## 2025-08-04 PROCEDURE — 83001 ASSAY OF GONADOTROPIN (FSH): CPT

## 2025-08-04 PROCEDURE — 1159F MED LIST DOCD IN RCRD: CPT | Performed by: NURSE PRACTITIONER

## 2025-08-04 RX ORDER — PROMETHAZINE HYDROCHLORIDE 25 MG/1
TABLET ORAL
COMMUNITY
Start: 2025-05-28

## 2025-08-04 RX ORDER — KETOCONAZOLE 20 MG/ML
SHAMPOO, SUSPENSION TOPICAL
COMMUNITY
Start: 2025-07-31

## 2025-08-04 RX ORDER — HYDROCODONE BITARTRATE AND ACETAMINOPHEN 7.5; 325 MG/1; MG/1
TABLET ORAL
COMMUNITY
Start: 2025-05-30

## 2025-08-21 ENCOUNTER — EXTERNAL PBMM DATA (OUTPATIENT)
Dept: PHARMACY | Facility: OTHER | Age: 50
End: 2025-08-21
Payer: MEDICARE

## (undated) DEVICE — SNAR E/S POLYP SNAREMASTER OVL/10MM 2.8X2300MM YEL

## (undated) DEVICE — Device

## (undated) DEVICE — SOL IRRG H2O PL/BG 1000ML STRL

## (undated) DEVICE — BLCK/BITE BLOX WO/DENTL/RIM W/STRAP 54F

## (undated) DEVICE — Device: Brand: DEFENDO AIR/WATER/SUCTION AND BIOPSY VALVE

## (undated) DEVICE — SOLIDIFIER LIQLOC PLS 1500CC BT